# Patient Record
Sex: FEMALE | Race: WHITE | NOT HISPANIC OR LATINO | Employment: UNEMPLOYED | URBAN - METROPOLITAN AREA
[De-identification: names, ages, dates, MRNs, and addresses within clinical notes are randomized per-mention and may not be internally consistent; named-entity substitution may affect disease eponyms.]

---

## 2023-09-03 PROBLEM — I21.09 ACUTE MYOCARDIAL INFARCTION OF ANTERIOR WALL (MULTI): Status: ACTIVE | Noted: 2023-09-03

## 2023-09-03 PROBLEM — E78.00 ELEVATED LDL CHOLESTEROL LEVEL: Status: ACTIVE | Noted: 2021-02-18

## 2023-09-03 PROBLEM — M25.50 POLYARTHRALGIA: Status: ACTIVE | Noted: 2020-10-02

## 2023-09-03 PROBLEM — M81.0 AGE-RELATED OSTEOPOROSIS WITHOUT CURRENT PATHOLOGICAL FRACTURE: Status: ACTIVE | Noted: 2023-09-03

## 2023-09-03 PROBLEM — E78.2 MIXED HYPERLIPIDEMIA: Status: ACTIVE | Noted: 2023-09-03

## 2023-09-03 PROBLEM — I25.10 ASHD (ARTERIOSCLEROTIC HEART DISEASE): Status: ACTIVE | Noted: 2023-09-03

## 2023-09-03 PROBLEM — E55.9 VITAMIN D DEFICIENCY: Status: ACTIVE | Noted: 2020-02-17

## 2023-09-03 PROBLEM — I50.22 CHRONIC SYSTOLIC HEART FAILURE (MULTI): Status: ACTIVE | Noted: 2023-09-03

## 2023-09-03 PROBLEM — M06.9 RHEUMATOID ARTHRITIS (MULTI): Status: ACTIVE | Noted: 2023-09-03

## 2023-09-03 PROBLEM — R70.0 ELEVATED SED RATE: Status: ACTIVE | Noted: 2020-10-06

## 2023-09-03 PROBLEM — R89.9 ABNORMAL LABORATORY TEST: Status: ACTIVE | Noted: 2020-10-06

## 2023-09-03 PROBLEM — E53.8 COBALAMIN DEFICIENCY: Status: ACTIVE | Noted: 2020-02-17

## 2023-09-03 PROBLEM — M18.11 DEGENERATIVE ARTHRITIS OF THUMB, RIGHT: Status: ACTIVE | Noted: 2023-09-03

## 2023-09-03 PROBLEM — E66.9 OBESITY: Status: ACTIVE | Noted: 2023-09-03

## 2023-09-03 RX ORDER — HYDROXYCHLOROQUINE SULFATE 200 MG/1
200 TABLET, FILM COATED ORAL 2 TIMES DAILY
COMMUNITY
Start: 2023-08-18 | End: 2023-11-16

## 2023-09-03 RX ORDER — CLONAZEPAM 0.5 MG/1
0.5 TABLET ORAL 2 TIMES DAILY PRN
COMMUNITY
End: 2023-12-18 | Stop reason: WASHOUT

## 2023-09-03 RX ORDER — ASPIRIN 81 MG/1
81 TABLET ORAL DAILY
COMMUNITY
End: 2023-12-18 | Stop reason: WASHOUT

## 2023-09-03 RX ORDER — CLOPIDOGREL BISULFATE 75 MG/1
75 TABLET ORAL DAILY
COMMUNITY
Start: 2022-07-21

## 2023-09-03 RX ORDER — LOSARTAN POTASSIUM 25 MG/1
25 TABLET ORAL DAILY
COMMUNITY
End: 2023-12-18 | Stop reason: DRUGHIGH

## 2023-09-03 RX ORDER — ROSUVASTATIN CALCIUM 10 MG/1
1 TABLET, COATED ORAL DAILY
COMMUNITY

## 2023-09-03 RX ORDER — PRASUGREL 10 MG/1
10 TABLET, FILM COATED ORAL 2 TIMES DAILY
COMMUNITY
End: 2023-12-18 | Stop reason: WASHOUT

## 2023-09-03 RX ORDER — ETANERCEPT 50 MG/ML
50 SOLUTION SUBCUTANEOUS
COMMUNITY
End: 2023-12-18 | Stop reason: WASHOUT

## 2023-09-03 RX ORDER — LEFLUNOMIDE 10 MG/1
10 TABLET ORAL DAILY
COMMUNITY
End: 2023-12-18 | Stop reason: WASHOUT

## 2023-09-03 RX ORDER — CARVEDILOL 3.12 MG/1
3.12 TABLET ORAL 2 TIMES DAILY
COMMUNITY
End: 2023-12-18 | Stop reason: WASHOUT

## 2023-09-03 RX ORDER — NITROGLYCERIN 0.4 MG/1
0.4 TABLET SUBLINGUAL EVERY 5 MIN PRN
COMMUNITY

## 2023-09-03 RX ORDER — CHOLECALCIFEROL (VITAMIN D3) 50 MCG
2000 TABLET ORAL DAILY
COMMUNITY

## 2023-10-18 ENCOUNTER — TELEPHONE (OUTPATIENT)
Dept: CARDIOLOGY | Facility: CLINIC | Age: 68
End: 2023-10-18

## 2023-12-12 ENCOUNTER — TELEPHONE (OUTPATIENT)
Dept: CARDIOLOGY | Facility: CLINIC | Age: 68
End: 2023-12-12

## 2023-12-15 ENCOUNTER — LAB (OUTPATIENT)
Dept: LAB | Facility: LAB | Age: 68
End: 2023-12-15
Payer: MEDICARE

## 2023-12-15 ENCOUNTER — HOSPITAL ENCOUNTER (OUTPATIENT)
Dept: CARDIOLOGY | Facility: HOSPITAL | Age: 68
Discharge: HOME | End: 2023-12-15
Payer: MEDICARE

## 2023-12-15 DIAGNOSIS — E78.2 MIXED HYPERLIPIDEMIA: Primary | ICD-10-CM

## 2023-12-15 DIAGNOSIS — I50.22 CHRONIC SYSTOLIC (CONGESTIVE) HEART FAILURE (MULTI): ICD-10-CM

## 2023-12-15 DIAGNOSIS — I50.9 HEART FAILURE, UNSPECIFIED (MULTI): ICD-10-CM

## 2023-12-15 LAB
AORTIC VALVE PEAK VELOCITY: 1.54
AV PEAK GRADIENT: 9.5
AVA (PEAK VEL): 2.17
CHOLEST SERPL-MCNC: 137 MG/DL (ref 133–200)
CHOLEST/HDLC SERPL: 2.6 {RATIO}
EJECTION FRACTION APICAL 4 CHAMBER: 40.9
EJECTION FRACTION: 39
GLOBAL LONGITUDINAL STRAIN: -10
HDLC SERPL-MCNC: 52 MG/DL
LDLC SERPL CALC-MCNC: 69 MG/DL (ref 65–130)
LEFT ATRIUM VOLUME AREA LENGTH INDEX BSA: 29.3
LEFT VENTRICLE INTERNAL DIMENSION DIASTOLE: 5.27 (ref 3.5–6)
LEFT VENTRICULAR OUTFLOW TRACT DIAMETER: 2.1
MITRAL VALVE E/A RATIO: 0.98
MITRAL VALVE E/E' RATIO: 13.18
RIGHT VENTRICLE FREE WALL PEAK S': 10.7
TRICUSPID ANNULAR PLANE SYSTOLIC EXCURSION: 2.4
TRIGL SERPL-MCNC: 82 MG/DL (ref 40–150)

## 2023-12-15 PROCEDURE — 80061 LIPID PANEL: CPT

## 2023-12-15 PROCEDURE — 36415 COLL VENOUS BLD VENIPUNCTURE: CPT

## 2023-12-15 PROCEDURE — 2500000004 HC RX 250 GENERAL PHARMACY W/ HCPCS (ALT 636 FOR OP/ED): Performed by: INTERNAL MEDICINE

## 2023-12-15 PROCEDURE — 93306 TTE W/DOPPLER COMPLETE: CPT

## 2023-12-15 PROCEDURE — 93306 TTE W/DOPPLER COMPLETE: CPT | Performed by: INTERNAL MEDICINE

## 2023-12-15 RX ADMIN — PERFLUTREN 2 ML OF DILUTION: 6.52 INJECTION, SUSPENSION INTRAVENOUS at 11:00

## 2023-12-15 NOTE — PROGRESS NOTES
"Annual-menopause  Subjective   Francine Amaya is a 68 y.o. female who is here for a menopausal gyn  exam.  denies vag bleed or d/c; denies pelvic pain, pressure, or persistent bloating.         Concerns=feels like getting uti/left sample.  PMHx: BMI 36.       Rheumatoid arthritis ? rheum specialists disagree on dx.       ASHD s/p AWMI [facility] [facility] 7/31/2021. LVEF 35-39% (echo 8/21).       Nuclear stress test: Negative ischemia, anterior infarct, EF 45% (7/22).  Surg Hx: kidney stones 19982        rt knee replacement        bunion surgery both feet        eyelid lift        c-sections 1984.1988  Last pap  11/19/2021-neg.hpv-neg.   Mamm 12/18/23 completed/report pending.   DEXA 06/2023 spine 0.2/hip-0.9   Periods : Menopausal;early 50's;never hrt.   Menarche 13.   Sexual activity currently sexually active; dryness/discomfort/infreq.   Total pregnancies  2.  C section(s)  2.   Review of Systems   Constitutional:  Negative for activity change, fatigue and unexpected weight change.   Respiratory:  Negative for chest tightness and shortness of breath.    Cardiovascular:  Negative for chest pain and leg swelling.   Gastrointestinal:  Negative for abdominal distention and abdominal pain.   Genitourinary:  Positive for difficulty urinating, dysuria and frequency. Negative for genital sores, pelvic pain, vaginal bleeding, vaginal discharge and vaginal pain.   Musculoskeletal:  Negative for gait problem and joint swelling.   Skin:  Negative for color change and rash.   Neurological:  Negative for dizziness, weakness and headaches.   Hematological:  Negative for adenopathy.   Objective  Visit Vitals  /62   Ht 1.651 m (5' 5\")   Wt 99.3 kg (219 lb)   BMI 36.44 kg/m²   OB Status Postmenopausal   Smoking Status Never   BSA 2.13 m²       General:   Alert and oriented, in no acute distress   Neck: Supple. No visible thyromegaly.    Breast/Axilla: Normal to palpation bilaterally without masses, skin changes, or nipple discharge.  "   Abdomen: Soft, non-tender, without masses or organomegaly   Vulva: Normal architecture without erythema, masses, or lesions.    Vagina: mucosa without lesions, masses; pos for  atrophy. No abnormal vaginal discharge.    Cervix: Normal without masses, lesions, or signs of cervicitis.    Uterus:  Grossly Normal mobile, non-enlarged uterus ; exam limited by bmi   Adnexa: No palpable  masses or tenderness; exam limited by bmi   Pelvic Floor No POP noted. No high tone pelvic floor    Psych  Rectal Normal affect. Normal mood.        Assessment/Plan   Encounter Diagnoses   Name Primary?    Female climacteric state; grossly normal breast exam; grossly normal GYN exam but limited by BMI.     Dysuria; in office urinalysis positive for nitrites; will send for culture     Acute cystitis without hematuria; prescription for Macrobid sent to local pharmacy Yes    Postmenopausal atrophic vaginitis; treatments for GSM reviewed    Alexsandra Moscoso MD

## 2023-12-18 ENCOUNTER — LAB (OUTPATIENT)
Dept: LAB | Facility: LAB | Age: 68
End: 2023-12-18
Payer: MEDICARE

## 2023-12-18 ENCOUNTER — APPOINTMENT (OUTPATIENT)
Dept: OBSTETRICS AND GYNECOLOGY | Facility: CLINIC | Age: 68
End: 2023-12-18

## 2023-12-18 ENCOUNTER — APPOINTMENT (OUTPATIENT)
Dept: RADIOLOGY | Facility: CLINIC | Age: 68
End: 2023-12-18
Payer: MEDICARE

## 2023-12-18 ENCOUNTER — ANCILLARY PROCEDURE (OUTPATIENT)
Dept: RADIOLOGY | Facility: CLINIC | Age: 68
End: 2023-12-18
Payer: MEDICARE

## 2023-12-18 ENCOUNTER — OFFICE VISIT (OUTPATIENT)
Dept: CARDIOLOGY | Facility: CLINIC | Age: 68
End: 2023-12-18
Payer: MEDICARE

## 2023-12-18 ENCOUNTER — OFFICE VISIT (OUTPATIENT)
Dept: OBSTETRICS AND GYNECOLOGY | Facility: CLINIC | Age: 68
End: 2023-12-18
Payer: MEDICARE

## 2023-12-18 VITALS
BODY MASS INDEX: 36.49 KG/M2 | SYSTOLIC BLOOD PRESSURE: 122 MMHG | WEIGHT: 219 LBS | HEIGHT: 65 IN | DIASTOLIC BLOOD PRESSURE: 62 MMHG

## 2023-12-18 VITALS
SYSTOLIC BLOOD PRESSURE: 134 MMHG | BODY MASS INDEX: 36.61 KG/M2 | DIASTOLIC BLOOD PRESSURE: 84 MMHG | HEART RATE: 77 BPM | WEIGHT: 220 LBS | OXYGEN SATURATION: 98 %

## 2023-12-18 VITALS — BODY MASS INDEX: 35.82 KG/M2 | WEIGHT: 215 LBS | HEIGHT: 65 IN

## 2023-12-18 DIAGNOSIS — I25.10 ASHD (ARTERIOSCLEROTIC HEART DISEASE): Primary | ICD-10-CM

## 2023-12-18 DIAGNOSIS — Z12.31 ENCOUNTER FOR SCREENING MAMMOGRAM FOR MALIGNANT NEOPLASM OF BREAST: ICD-10-CM

## 2023-12-18 DIAGNOSIS — E78.2 MIXED HYPERLIPIDEMIA: ICD-10-CM

## 2023-12-18 DIAGNOSIS — N95.1 FEMALE CLIMACTERIC STATE: ICD-10-CM

## 2023-12-18 DIAGNOSIS — R30.0 DYSURIA: ICD-10-CM

## 2023-12-18 DIAGNOSIS — I25.10 ASHD (ARTERIOSCLEROTIC HEART DISEASE): ICD-10-CM

## 2023-12-18 DIAGNOSIS — I50.22 CHRONIC SYSTOLIC HEART FAILURE (MULTI): ICD-10-CM

## 2023-12-18 DIAGNOSIS — N30.00 ACUTE CYSTITIS WITHOUT HEMATURIA: Primary | ICD-10-CM

## 2023-12-18 DIAGNOSIS — I51.3 APICAL MURAL THROMBUS: ICD-10-CM

## 2023-12-18 DIAGNOSIS — N95.2 POSTMENOPAUSAL ATROPHIC VAGINITIS: ICD-10-CM

## 2023-12-18 LAB
ERYTHROCYTE [DISTWIDTH] IN BLOOD BY AUTOMATED COUNT: 13.6 % (ref 11.5–14.5)
HCT VFR BLD AUTO: 37.6 % (ref 36–46)
HGB BLD-MCNC: 11.7 G/DL (ref 12–16)
MCH RBC QN AUTO: 31.4 PG (ref 26–34)
MCHC RBC AUTO-ENTMCNC: 31.1 G/DL (ref 32–36)
MCV RBC AUTO: 101 FL (ref 80–100)
NRBC BLD-RTO: 0 /100 WBCS (ref 0–0)
PLATELET # BLD AUTO: 194 X10*3/UL (ref 150–450)
POC APPEARANCE, URINE: ABNORMAL
POC BILIRUBIN, URINE: NEGATIVE
POC BLOOD, URINE: ABNORMAL
POC COLOR, URINE: YELLOW
POC GLUCOSE, URINE: NEGATIVE MG/DL
POC KETONES, URINE: NEGATIVE MG/DL
POC LEUKOCYTES, URINE: ABNORMAL
POC NITRITE,URINE: POSITIVE
POC PH, URINE: 5.5 PH
POC PROTEIN, URINE: NEGATIVE MG/DL
POC SPECIFIC GRAVITY, URINE: >=1.03
POC UROBILINOGEN, URINE: 0.2 EU/DL
RBC # BLD AUTO: 3.73 X10*6/UL (ref 4–5.2)
WBC # BLD AUTO: 6.1 X10*3/UL (ref 4.4–11.3)

## 2023-12-18 PROCEDURE — 87086 URINE CULTURE/COLONY COUNT: CPT | Performed by: OBSTETRICS & GYNECOLOGY

## 2023-12-18 PROCEDURE — 1036F TOBACCO NON-USER: CPT | Performed by: OBSTETRICS & GYNECOLOGY

## 2023-12-18 PROCEDURE — 1036F TOBACCO NON-USER: CPT | Performed by: INTERNAL MEDICINE

## 2023-12-18 PROCEDURE — 81003 URINALYSIS AUTO W/O SCOPE: CPT | Performed by: OBSTETRICS & GYNECOLOGY

## 2023-12-18 PROCEDURE — 85027 COMPLETE CBC AUTOMATED: CPT

## 2023-12-18 PROCEDURE — 36415 COLL VENOUS BLD VENIPUNCTURE: CPT

## 2023-12-18 PROCEDURE — 99214 OFFICE O/P EST MOD 30 MIN: CPT | Performed by: OBSTETRICS & GYNECOLOGY

## 2023-12-18 PROCEDURE — 77067 SCR MAMMO BI INCL CAD: CPT

## 2023-12-18 PROCEDURE — 99214 OFFICE O/P EST MOD 30 MIN: CPT | Performed by: INTERNAL MEDICINE

## 2023-12-18 PROCEDURE — 1126F AMNT PAIN NOTED NONE PRSNT: CPT | Performed by: OBSTETRICS & GYNECOLOGY

## 2023-12-18 PROCEDURE — 1159F MED LIST DOCD IN RCRD: CPT | Performed by: INTERNAL MEDICINE

## 2023-12-18 PROCEDURE — 1126F AMNT PAIN NOTED NONE PRSNT: CPT | Performed by: INTERNAL MEDICINE

## 2023-12-18 PROCEDURE — 1159F MED LIST DOCD IN RCRD: CPT | Performed by: OBSTETRICS & GYNECOLOGY

## 2023-12-18 RX ORDER — NITROFURANTOIN (MACROCRYSTALS) 100 MG/1
100 CAPSULE ORAL 2 TIMES DAILY
Qty: 10 CAPSULE | Refills: 0 | Status: SHIPPED | OUTPATIENT
Start: 2023-12-18 | End: 2023-12-23

## 2023-12-18 RX ORDER — LOSARTAN POTASSIUM 50 MG/1
50 TABLET ORAL DAILY
Qty: 90 TABLET | Refills: 3 | Status: SHIPPED | OUTPATIENT
Start: 2023-12-18 | End: 2024-12-17

## 2023-12-18 ASSESSMENT — ENCOUNTER SYMPTOMS
FATIGUE: 0
ABDOMINAL PAIN: 0
NUMBNESS: 0
DYSURIA: 1
ACTIVITY CHANGE: 0
DYSURIA: 0
HEADACHES: 0
FREQUENCY: 1
ADENOPATHY: 0
JOINT SWELLING: 0
SHORTNESS OF BREATH: 0
COUGH: 0
ABDOMINAL DISTENTION: 0
DIZZINESS: 0
COLOR CHANGE: 0
UNEXPECTED WEIGHT CHANGE: 0
LOSS OF SENSATION IN FEET: 0
DIFFICULTY URINATING: 1
DYSPNEA ON EXERTION: 0
BLURRED VISION: 0
ABDOMINAL PAIN: 0
PARESTHESIAS: 0
PALPITATIONS: 0
DEPRESSION: 0
SHORTNESS OF BREATH: 0
HEMATURIA: 0
WEAKNESS: 0
OCCASIONAL FEELINGS OF UNSTEADINESS: 0
CHEST TIGHTNESS: 0

## 2023-12-18 ASSESSMENT — LIFESTYLE VARIABLES
HOW OFTEN DO YOU HAVE A DRINK CONTAINING ALCOHOL: MONTHLY OR LESS
SKIP TO QUESTIONS 9-10: 1
HOW OFTEN DO YOU HAVE SIX OR MORE DRINKS ON ONE OCCASION: NEVER
HOW MANY STANDARD DRINKS CONTAINING ALCOHOL DO YOU HAVE ON A TYPICAL DAY: 1 OR 2
AUDIT-C TOTAL SCORE: 1

## 2023-12-18 ASSESSMENT — PATIENT HEALTH QUESTIONNAIRE - PHQ9
2. FEELING DOWN, DEPRESSED OR HOPELESS: NOT AT ALL
SUM OF ALL RESPONSES TO PHQ9 QUESTIONS 1 AND 2: 0
1. LITTLE INTEREST OR PLEASURE IN DOING THINGS: NOT AT ALL

## 2023-12-18 ASSESSMENT — PAIN SCALES - GENERAL: PAINLEVEL: 0-NO PAIN

## 2023-12-18 NOTE — ASSESSMENT & PLAN NOTE
Cardiac wise patient doing relatively well.  No chest pain or anginal type symptoms.  Overall good physical activity level.

## 2023-12-18 NOTE — ASSESSMENT & PLAN NOTE
I have suggested anticoagulation for 6 months.  Discussed warfarin versus DOAC.  Will prescribe Xarelto 20 mg daily and hopefully will be covered on plan.   Will also check CBC and make sure not anemic.

## 2023-12-18 NOTE — PROGRESS NOTES
Subjective   Francine Amaya is a 68 y.o. female.    Chief Complaint:  No chief complaint on file.    HPI  Some fatigue last 6 months or so.  No anginal type chest pains.  Told she has seronegative rheumatoid arthritis.    Review of Systems   Constitutional: Positive for malaise/fatigue.   HENT:  Negative for congestion.    Eyes:  Negative for blurred vision.   Cardiovascular:  Negative for chest pain, dyspnea on exertion and palpitations.   Respiratory:  Negative for cough and shortness of breath.    Musculoskeletal:  Positive for joint pain.   Gastrointestinal:  Negative for abdominal pain.   Genitourinary:  Negative for dysuria and hematuria.   Neurological:  Negative for numbness and paresthesias.       Objective   Constitutional:       Appearance: Not in distress.   Eyes:      Conjunctiva/sclera: Conjunctivae normal.   Neck:      Vascular: JVD normal.   Pulmonary:      Breath sounds: Normal breath sounds. No wheezing. No rhonchi. No rales.   Cardiovascular:      Normal rate. Regular rhythm.      Murmurs: There is no murmur.      No gallop.  No click. No rub.   Abdominal:      Palpations: Abdomen is soft.   Neurological:      General: No focal deficit present.      Mental Status: Alert.         Lab Review:   Office Visit on 12/18/2023   Component Date Value    POC Color, Urine 12/18/2023 Yellow     POC Appearance, Urine 12/18/2023 Cloudy (A)     POC Glucose, Urine 12/18/2023 NEGATIVE     POC Bilirubin, Urine 12/18/2023 NEGATIVE     POC Ketones, Urine 12/18/2023 NEGATIVE     POC Specific Gravity, Ur* 12/18/2023 >=1.030     POC Blood, Urine 12/18/2023 SMALL (1+) (A)     POC PH, Urine 12/18/2023 5.5     POC Protein, Urine 12/18/2023 NEGATIVE     POC Urobilinogen, Urine 12/18/2023 0.2     Poc Nitrite, Urine 12/18/2023 POSITIVE (A)     POC Leukocytes, Urine 12/18/2023 SMALL (1+) (A)    Hospital Outpatient Visit on 12/15/2023   Component Date Value    AV pk sue 12/15/2023 1.54     LVOT diam 12/15/2023 2.10     MV E/A  ratio 12/15/2023 0.98     Tricuspid annular plane * 12/15/2023 2.4     LV biplane EF 12/15/2023 39     LA vol index A/L 12/15/2023 29.3     MV avg E/e' ratio 12/15/2023 13.18     RV free wall pk S' 12/15/2023 10.70     LV GLS 12/15/2023 -10.0     LVIDd 12/15/2023 5.27     AV pk grad 12/15/2023 9.5     Aortic Valve Area by Con* 12/15/2023 2.17     LV A4C EF 12/15/2023 40.9    Lab on 12/15/2023   Component Date Value    Cholesterol 12/15/2023 137     HDL-Cholesterol 12/15/2023 52.0     Cholesterol/HDL Ratio 12/15/2023 2.6     LDL Calculated 12/15/2023 69     Triglycerides 12/15/2023 82        Assessment/Plan   The primary encounter diagnosis was ASHD (arteriosclerotic heart disease). Diagnoses of Chronic systolic heart failure (CMS/HCC) and Mixed hyperlipidemia were also pertinent to this visit.    Chronic systolic heart failure (CMS/HCC)  She has not tolerated beta blockers, carvedilol, and feels better off.      Mixed hyperlipidemia  Reviewed lipid panel:  Tchol 137 TG 82 HdL 52 LDL 61  Will recheck fall 2024    ASHD (arteriosclerotic heart disease)  Cardiac wise patient doing relatively well.  No chest pain or anginal type symptoms.  Overall good physical activity level.    Apical mural thrombus  I have suggested anticoagulation for 6 months.  Discussed warfarin versus DOAC.  Will prescribe Xarelto 20 mg daily and hopefully will be covered on plan.   Will also check CBC and make sure not anemic.

## 2023-12-20 ENCOUNTER — APPOINTMENT (OUTPATIENT)
Dept: OBSTETRICS AND GYNECOLOGY | Facility: CLINIC | Age: 68
End: 2023-12-20

## 2023-12-21 LAB — BACTERIA UR CULT: ABNORMAL

## 2024-01-10 ENCOUNTER — TELEPHONE (OUTPATIENT)
Dept: CARDIOLOGY | Facility: CLINIC | Age: 69
End: 2024-01-10
Payer: MEDICARE

## 2024-01-10 NOTE — TELEPHONE ENCOUNTER
Had long discussion with patient on anticoagulation therapy for her LV thrombus.  She is very angry about the cost of the Xarelto but will pay it since she will likely be on it for only 6 months.

## 2024-01-10 NOTE — TELEPHONE ENCOUNTER
Patient called the office today stating that her medicare won't allow her to use a copay card for xaralto and it will be $545.00 per month.  She is asking if there is something else she can take?  Please advise, thanks  Call back [phone]

## 2024-05-06 ENCOUNTER — OFFICE VISIT (OUTPATIENT)
Dept: OTOLARYNGOLOGY | Facility: CLINIC | Age: 69
End: 2024-05-06
Payer: MEDICARE

## 2024-05-06 VITALS — TEMPERATURE: 97.9 F | HEIGHT: 65 IN | WEIGHT: 210 LBS | BODY MASS INDEX: 34.99 KG/M2

## 2024-05-06 DIAGNOSIS — H61.21 IMPACTED CERUMEN OF RIGHT EAR: Primary | ICD-10-CM

## 2024-05-06 DIAGNOSIS — H90.3 SENSORINEURAL HEARING LOSS (SNHL) OF BOTH EARS: ICD-10-CM

## 2024-05-06 DIAGNOSIS — R42 DIZZINESS AND GIDDINESS: ICD-10-CM

## 2024-05-06 PROCEDURE — 99204 OFFICE O/P NEW MOD 45 MIN: CPT | Performed by: OTOLARYNGOLOGY

## 2024-05-06 PROCEDURE — 1159F MED LIST DOCD IN RCRD: CPT | Performed by: OTOLARYNGOLOGY

## 2024-05-06 PROCEDURE — 1036F TOBACCO NON-USER: CPT | Performed by: OTOLARYNGOLOGY

## 2024-05-06 NOTE — PROGRESS NOTES
"Chief Complaint   Patient presents with    New Patient Visit     NP HAD TM PERF IN DEC, RT EAR PAIN IN MARCH IN [place], HAD AUDIO IN  SYSTEM IN DEC, OFF BALANCE      Date of Evaluation: 2024   HPI  Francine Amaya is a 69 y.o. female here for ear complaints.  In December she had otalgia.  She was diagnosed with an ear infection and treated with doxycycline and felt better.  In March she developed an upper respiratory tract infection and the right ear has started to feel plugged with popping and intermittent pain.  She has been having some unsteadiness of her gait and some dizziness.  She feels as though she is not hearing well.  She has been treated with nasal sprays and antibiotics without benefit.  She has been flying back and forth from [place] and has not noticed any increased pain on the airplane.       Past Medical History:   Diagnosis Date    Ear pain, referred, right     Heart attack (Multi)     Hypertension       Past Surgical History:   Procedure Laterality Date    BUNIONECTOMY Bilateral     CAROTID STENT       SECTION, CLASSIC      IR INJECTION NERVE BLOCK      KIDNEY STONE SURGERY      KNEE SURGERY Right           Medications:   Current Outpatient Medications   Medication Instructions    cholecalciferol (VITAMIN D-3) 2,000 Units, oral, Daily    clopidogrel (PLAVIX) 75 mg, oral, Daily    losartan (COZAAR) 50 mg, oral, Daily    nitroglycerin (NITROSTAT) 0.4 mg, sublingual, Every 5 min PRN, may repeat up to 3 times before seeking medical attention<BR>    rivaroxaban (XARELTO) 20 mg, oral, Daily with evening meal, Take with food.    rosuvastatin (Crestor) 10 mg tablet 1 tablet, oral, Daily        Allergies:  Allergies   Allergen Reactions    Amoxicillin Unknown    Ciprofloxacin Unknown    Meperidine Unknown    Sulfa (Sulfonamide Antibiotics) Unknown    Sulfamethoxazole-Trimethoprim Hives        Physical Exam:  Last Recorded Vitals  Temperature 36.6 °C (97.9 °F), height 1.651 m (5' 5\"), weight 95.3 " kg (210 lb).  []General appearance: Well-developed, well-nourished in no acute distress, conversant with normal voice quality    Head/face: No erythema or edema or facial tenderness, and normal facial nerve function bilaterally    External ear: Clear external auditory canals with normal pinnae cerumen in the ear canal the drapes down across the tympanic membrane.  This was removed under the microscope  Tube status: N/A  Middle ear: Tympanic membranes intact and mobile, middle ears normal.  Right tympanosclerosis  Tympanic membrane perforation: N/A  Mastoid bowl: N/A  Hearing: Normal conversational awareness at normal speech thresholds    Nose visualized using: Anterior rhinoscopy  Nasal dorsum: Nontraumatic midline appearance  Septum: Midline, nonobstructing  Inferior turbinates: Normal, pink  Secretions: Dry    Oral cavity and oropharynx: Normal  Teeth: Good condition  Floor of mouth: without lesions  Palate: Normal hard palate, soft palate and uvula  Oropharynx: Clear, no lesions present  Buccal mucosa: Normal without masses or lesions  Lips: Normal    Nasopharynx: Inadequate mirror exam secondary to gag/anatomy    Neck:  Salivary glands: Normal bilateral parotid and submandibular glands by inspection and palpation.  Non-thyroid masses: No palpable masses or significant lymphadenopathy  Trachea: Midline  Thyroid: No thyromegaly or palpable nodules  Temporomandibular joint: Nontender  Cervical range of motion: Normal    Neurologic exam: Alert and oriented x3, appropriate affect.  Cranial nerves II-XII normal bilaterally  Extraocular movement: Extraocular movement intact, normal gaze alignment        Francine was seen today for new patient visit.  Diagnoses and all orders for this visit:  Impacted cerumen of right ear (Primary)  Sensorineural hearing loss (SNHL) of both ears  -     Comprehensive hearing test; Future  Dizziness and giddiness  -     V-Hit; Future       PLAN  I believe her current ear plugging and  decreased hearing symptoms are related to cerumen that I have resolved with removal of cerumen.  She is having some more chronic dizziness.  I have recommended a VNG and audiogram.  Follow-up afterwards    Gerber Amos MD

## 2024-05-19 DIAGNOSIS — I50.22 CHRONIC SYSTOLIC HEART FAILURE (MULTI): Primary | ICD-10-CM

## 2024-05-20 RX ORDER — LOSARTAN POTASSIUM 25 MG/1
25 TABLET ORAL DAILY
Qty: 90 TABLET | Refills: 3 | Status: SHIPPED | OUTPATIENT
Start: 2024-05-20

## 2024-06-04 DIAGNOSIS — I25.10 ASHD (ARTERIOSCLEROTIC HEART DISEASE): Primary | ICD-10-CM

## 2024-06-04 RX ORDER — CARVEDILOL 3.12 MG/1
3.12 TABLET ORAL
Qty: 180 TABLET | Refills: 3 | Status: SHIPPED | OUTPATIENT
Start: 2024-06-04

## 2024-06-04 RX ORDER — ASPIRIN 81 MG/1
81 TABLET ORAL DAILY
Qty: 90 TABLET | Refills: 3 | Status: SHIPPED | OUTPATIENT
Start: 2024-06-04

## 2024-06-06 DIAGNOSIS — I51.3 APICAL MURAL THROMBUS: ICD-10-CM

## 2024-06-06 DIAGNOSIS — I25.10 ASHD (ARTERIOSCLEROTIC HEART DISEASE): ICD-10-CM

## 2024-06-06 DIAGNOSIS — I50.22 CHRONIC SYSTOLIC HEART FAILURE (MULTI): ICD-10-CM

## 2024-06-06 DIAGNOSIS — E78.2 MIXED HYPERLIPIDEMIA: ICD-10-CM

## 2024-06-11 ENCOUNTER — APPOINTMENT (OUTPATIENT)
Dept: AUDIOLOGY | Facility: CLINIC | Age: 69
End: 2024-06-11
Payer: MEDICARE

## 2024-06-19 ENCOUNTER — APPOINTMENT (OUTPATIENT)
Dept: OTOLARYNGOLOGY | Facility: CLINIC | Age: 69
End: 2024-06-19
Payer: MEDICARE

## 2024-07-01 NOTE — ASSESSMENT & PLAN NOTE
Lipids done in December: Tchol 137 TG 82 HDL 52 LDL 69.  Reasonable numbers no change will be made at this time.

## 2024-07-03 ENCOUNTER — OFFICE VISIT (OUTPATIENT)
Dept: CARDIOLOGY | Facility: CLINIC | Age: 69
End: 2024-07-03
Payer: MEDICARE

## 2024-07-03 VITALS
DIASTOLIC BLOOD PRESSURE: 64 MMHG | BODY MASS INDEX: 32.45 KG/M2 | SYSTOLIC BLOOD PRESSURE: 114 MMHG | HEART RATE: 72 BPM | WEIGHT: 195 LBS

## 2024-07-03 DIAGNOSIS — I25.10 ASHD (ARTERIOSCLEROTIC HEART DISEASE): Primary | ICD-10-CM

## 2024-07-03 DIAGNOSIS — E78.2 MIXED HYPERLIPIDEMIA: ICD-10-CM

## 2024-07-03 DIAGNOSIS — I50.22 CHRONIC SYSTOLIC HEART FAILURE (MULTI): ICD-10-CM

## 2024-07-03 PROCEDURE — 99214 OFFICE O/P EST MOD 30 MIN: CPT | Performed by: INTERNAL MEDICINE

## 2024-07-03 PROCEDURE — 1036F TOBACCO NON-USER: CPT | Performed by: INTERNAL MEDICINE

## 2024-07-03 PROCEDURE — 1126F AMNT PAIN NOTED NONE PRSNT: CPT | Performed by: INTERNAL MEDICINE

## 2024-07-03 PROCEDURE — 1159F MED LIST DOCD IN RCRD: CPT | Performed by: INTERNAL MEDICINE

## 2024-07-03 RX ORDER — CELECOXIB 200 MG/1
1 CAPSULE ORAL DAILY PRN
COMMUNITY
Start: 2024-06-03

## 2024-07-03 ASSESSMENT — ENCOUNTER SYMPTOMS
PARESTHESIAS: 0
PALPITATIONS: 0
HEMATURIA: 0
DYSPNEA ON EXERTION: 0
SHORTNESS OF BREATH: 0
NUMBNESS: 0
BLURRED VISION: 0
ABDOMINAL PAIN: 0
DYSURIA: 0
COUGH: 0

## 2024-07-03 ASSESSMENT — PAIN SCALES - GENERAL: PAINLEVEL: 0-NO PAIN

## 2024-07-03 NOTE — ASSESSMENT & PLAN NOTE
Stable with no anginal type symptoms.  Her last echocardiogram did reveal apical wall akinesia with a probable apical thrombus and she was placed on Xarelto.  Will repeat echocardiogram and if no significant thrombus burden will eliminate Xarelto and continue antiplatelet therapy with clopidogrel 75 mg once daily.  She will call me the day after her echocardiogram to review the results over the phone.

## 2024-07-03 NOTE — PROGRESS NOTES
Subjective   Francine Amaya is a 69 y.o. female.    Chief Complaint:  ASHD (6 MONTH F/U)    HPI  Overall patient feels well other than arthritic joint pains.  No chest pain or anginal type symptoms.  No palpitations.    Review of Systems   Constitutional: Negative for malaise/fatigue.   HENT:  Negative for congestion.    Eyes:  Negative for blurred vision.   Cardiovascular:  Negative for chest pain, dyspnea on exertion and palpitations.   Respiratory:  Negative for cough and shortness of breath.    Musculoskeletal:  Positive for arthritis and joint pain.   Gastrointestinal:  Negative for abdominal pain.   Genitourinary:  Negative for dysuria and hematuria.   Neurological:  Negative for numbness and paresthesias.       Objective   Constitutional:       Appearance: Not in distress.   Eyes:      Conjunctiva/sclera: Conjunctivae normal.   Neck:      Vascular: JVD normal.   Pulmonary:      Breath sounds: Normal breath sounds. No wheezing. No rhonchi. No rales.   Cardiovascular:      Normal rate. Regular rhythm.      Murmurs: There is no murmur.      No gallop.  No click. No rub.   Abdominal:      Palpations: Abdomen is soft.   Neurological:      General: No focal deficit present.      Mental Status: Alert.         Lab Review:   No visits with results within 2 Month(s) from this visit.   Latest known visit with results is:   Lab on 12/18/2023   Component Date Value    WBC 12/18/2023 6.1     nRBC 12/18/2023 0.0     RBC 12/18/2023 3.73 (L)     Hemoglobin 12/18/2023 11.7 (L)     Hematocrit 12/18/2023 37.6     MCV 12/18/2023 101 (H)     MCH 12/18/2023 31.4     MCHC 12/18/2023 31.1 (L)     RDW 12/18/2023 13.6     Platelets 12/18/2023 194        Assessment/Plan   The primary encounter diagnosis was ASHD (arteriosclerotic heart disease). Diagnoses of Chronic systolic heart failure (Multi) and Mixed hyperlipidemia were also pertinent to this visit.    Mixed hyperlipidemia  Lipids done in December: Tchol 137 TG 82 HDL 52 LDL 69.   Reasonable numbers no change will be made at this time.    ASHD (arteriosclerotic heart disease)  Stable with no anginal type symptoms.  Her last echocardiogram did reveal apical wall akinesia with a probable apical thrombus and she was placed on Xarelto.  Will repeat echocardiogram and if no significant thrombus burden will eliminate Xarelto and continue antiplatelet therapy with clopidogrel 75 mg once daily.  She will call me the day after her echocardiogram to review the results over the phone.    Chronic systolic heart failure (Multi)  Clinically very well compensated at this time.  Will recheck echocardiogram primarily to look for evidence of apical thrombus but also to assess left ventricular systolic function as well.  The patient will call me the day after the echocardiogram and we will decide on whether to discontinue the Xarelto therapy.  Continue losartan at 50 mg daily.  Will follow clinically.  Return for office visit prior to her [place] trip in December.

## 2024-07-03 NOTE — ASSESSMENT & PLAN NOTE
Clinically very well compensated at this time.  Will recheck echocardiogram primarily to look for evidence of apical thrombus but also to assess left ventricular systolic function as well.  The patient will call me the day after the echocardiogram and we will decide on whether to discontinue the Xarelto therapy.  Continue losartan at 50 mg daily.  Will follow clinically.  Return for office visit prior to her [place] trip in December.

## 2024-07-10 ENCOUNTER — TELEPHONE (OUTPATIENT)
Dept: CARDIOLOGY | Facility: CLINIC | Age: 69
End: 2024-07-10
Payer: MEDICARE

## 2024-07-10 NOTE — TELEPHONE ENCOUNTER
Called patient and stated okay to place the Xarelto on hold after she finishes up the current prescription.  She will have the echocardiogram on July 25 and will call me the day after and then we will repeat discuss whether continuing anticoagulation will be recommended or not.

## 2024-07-10 NOTE — TELEPHONE ENCOUNTER
Patient called said her Echo is scheduled 7/25, and Dr. Roberts said she may be able to go off of her xarelto pending results. In the meantime she has enough pills for this week only but doesn't want to pay $100 for the next refill if she will be going off of it. Patient wants to know if its safe to stop it until results of echo so she doesn't have to waste money on medication if she doesn't need it.

## 2024-07-25 ENCOUNTER — HOSPITAL ENCOUNTER (OUTPATIENT)
Dept: CARDIOLOGY | Facility: HOSPITAL | Age: 69
Discharge: HOME | End: 2024-07-25
Payer: MEDICARE

## 2024-07-25 DIAGNOSIS — I50.22 CHRONIC SYSTOLIC HEART FAILURE (MULTI): ICD-10-CM

## 2024-07-25 LAB
AORTIC VALVE MEAN GRADIENT: 4.8 MMHG
AORTIC VALVE PEAK VELOCITY: 1.46 M/S
AV PEAK GRADIENT: 8.6 MMHG
AVA (PEAK VEL): 1.81 CM2
AVA (VTI): 1.93 CM2
EJECTION FRACTION APICAL 4 CHAMBER: 35.3
EJECTION FRACTION: 33 %
LEFT ATRIUM VOLUME AREA LENGTH INDEX BSA: 33 ML/M2
LEFT VENTRICLE INTERNAL DIMENSION DIASTOLE: 6.12 CM (ref 3.5–6)
LEFT VENTRICULAR OUTFLOW TRACT DIAMETER: 2.07 CM
LV EJECTION FRACTION BIPLANE: 33 %
MITRAL VALVE E/A RATIO: 0.7
MITRAL VALVE E/E' RATIO: 14.01
RIGHT VENTRICLE FREE WALL PEAK S': 12.15 CM/S
TRICUSPID ANNULAR PLANE SYSTOLIC EXCURSION: 2 CM

## 2024-07-25 PROCEDURE — 93306 TTE W/DOPPLER COMPLETE: CPT | Performed by: INTERNAL MEDICINE

## 2024-07-25 PROCEDURE — 2500000004 HC RX 250 GENERAL PHARMACY W/ HCPCS (ALT 636 FOR OP/ED): Performed by: INTERNAL MEDICINE

## 2024-07-25 PROCEDURE — 93306 TTE W/DOPPLER COMPLETE: CPT

## 2024-07-26 ENCOUNTER — TELEPHONE (OUTPATIENT)
Dept: CARDIOLOGY | Facility: CLINIC | Age: 69
End: 2024-07-26

## 2024-07-26 NOTE — TELEPHONE ENCOUNTER
Pt called the office requesting result for recent Echo result   Please advice   Good call back number  [phone]

## 2024-07-29 NOTE — TELEPHONE ENCOUNTER
Left message on voice mail that echo did not reveal any visible thrombus, thus ok to stop the Xarelto.  With reduced EF, stay on Carvedilol and Losartan, likely repeat echo next year and reassess.

## 2024-08-03 DIAGNOSIS — I25.10 ASHD (ARTERIOSCLEROTIC HEART DISEASE): Primary | ICD-10-CM

## 2024-08-05 RX ORDER — CLOPIDOGREL BISULFATE 75 MG/1
75 TABLET ORAL DAILY
Qty: 90 TABLET | Refills: 3 | Status: SHIPPED | OUTPATIENT
Start: 2024-08-05

## 2024-10-04 ENCOUNTER — TELEPHONE (OUTPATIENT)
Dept: CARDIOLOGY | Facility: CLINIC | Age: 69
End: 2024-10-04

## 2024-10-04 NOTE — TELEPHONE ENCOUNTER
Pt called the office stated she has a upcoming Nerve Block 1 procedure 10/14/2024  With Dr. Munoz   Pt needs to know if she needs to hold any of her blood thinners prior to procedure   Please advice   Good call back number  [phone]

## 2024-10-04 NOTE — TELEPHONE ENCOUNTER
Called patient and agreed to hold Xarelto 48 hours prior to her nerve block and the Plavix 1 week prior to the nerve block.

## 2024-10-29 ENCOUNTER — TELEPHONE (OUTPATIENT)
Dept: OBSTETRICS AND GYNECOLOGY | Facility: CLINIC | Age: 69
End: 2024-10-29
Payer: MEDICARE

## 2024-10-29 DIAGNOSIS — Z12.31 ENCOUNTER FOR SCREENING MAMMOGRAM FOR MALIGNANT NEOPLASM OF BREAST: ICD-10-CM

## 2024-11-06 DIAGNOSIS — E78.2 MIXED HYPERLIPIDEMIA: Primary | ICD-10-CM

## 2024-11-06 RX ORDER — ROSUVASTATIN CALCIUM 10 MG/1
10 TABLET, COATED ORAL DAILY
Qty: 90 TABLET | Refills: 3 | Status: SHIPPED | OUTPATIENT
Start: 2024-11-06

## 2024-11-12 RX ORDER — ROSUVASTATIN CALCIUM 10 MG/1
10 TABLET, COATED ORAL DAILY
Qty: 90 TABLET | OUTPATIENT
Start: 2024-11-12

## 2024-12-02 NOTE — ASSESSMENT & PLAN NOTE
Patient did have apical thrombus and placed on Xarelto.  Echo from July 2024 did reveal akinetic apex but no visible thrombus.  Patient now very stable and well compensated.  As noted previously last echocardiogram suggested modestly decreased left ventricular systolic function.  I am going to order a MUGA scan to reassess ejection fraction.  If less than 35% will need to consider referral to Dr. Castro to consider ICD therapy.  The patient will try to have the MUGA scan done before she travels to [place] for the winter.

## 2024-12-02 NOTE — ASSESSMENT & PLAN NOTE
Lipid panel done recently at the [facility] and LDL cholesterol down to 61.  Low dose rosuvastatin has been effective in reducing LDL cholesterol.

## 2024-12-05 ENCOUNTER — OFFICE VISIT (OUTPATIENT)
Dept: CARDIOLOGY | Facility: CLINIC | Age: 69
End: 2024-12-05
Payer: MEDICARE

## 2024-12-05 VITALS
HEART RATE: 76 BPM | BODY MASS INDEX: 26.79 KG/M2 | DIASTOLIC BLOOD PRESSURE: 62 MMHG | WEIGHT: 161 LBS | SYSTOLIC BLOOD PRESSURE: 92 MMHG

## 2024-12-05 DIAGNOSIS — I25.10 ASHD (ARTERIOSCLEROTIC HEART DISEASE): Primary | ICD-10-CM

## 2024-12-05 DIAGNOSIS — I50.22 CHRONIC SYSTOLIC HEART FAILURE: ICD-10-CM

## 2024-12-05 DIAGNOSIS — E78.2 MIXED HYPERLIPIDEMIA: ICD-10-CM

## 2024-12-05 PROCEDURE — 99214 OFFICE O/P EST MOD 30 MIN: CPT | Performed by: INTERNAL MEDICINE

## 2024-12-05 PROCEDURE — 1036F TOBACCO NON-USER: CPT | Performed by: INTERNAL MEDICINE

## 2024-12-05 PROCEDURE — 1159F MED LIST DOCD IN RCRD: CPT | Performed by: INTERNAL MEDICINE

## 2024-12-05 PROCEDURE — 1126F AMNT PAIN NOTED NONE PRSNT: CPT | Performed by: INTERNAL MEDICINE

## 2024-12-05 ASSESSMENT — PAIN SCALES - GENERAL: PAINLEVEL_OUTOF10: 0-NO PAIN

## 2024-12-05 ASSESSMENT — ENCOUNTER SYMPTOMS
BLURRED VISION: 0
COUGH: 0
DYSPNEA ON EXERTION: 0
DYSURIA: 0
SHORTNESS OF BREATH: 0
HEMATURIA: 0
DEPRESSION: 0
NUMBNESS: 0
PALPITATIONS: 0
OCCASIONAL FEELINGS OF UNSTEADINESS: 0
ABDOMINAL PAIN: 0
PARESTHESIAS: 0
LOSS OF SENSATION IN FEET: 1

## 2024-12-05 NOTE — PROGRESS NOTES
Subjective   Francine Amaya is a 69 y.o. female.    Chief Complaint:  Follow-up    HPI  The patient states that she feels wonderful.  No chest pain or anginal symptoms.  No unusual shortness of breath.  She went on Mounjaro and lost 63 pounds.  She is now off of the Mounjaro and she hopes she will be able to maintain the lower weight.    Review of Systems   Constitutional: Negative for malaise/fatigue.   HENT:  Negative for congestion.    Eyes:  Negative for blurred vision.   Cardiovascular:  Negative for chest pain, dyspnea on exertion and palpitations.   Respiratory:  Negative for cough and shortness of breath.    Musculoskeletal:  Negative for joint pain.   Gastrointestinal:  Negative for abdominal pain.   Genitourinary:  Negative for dysuria and hematuria.   Neurological:  Negative for numbness and paresthesias.       Objective   Constitutional:       Appearance: Not in distress.   Eyes:      Conjunctiva/sclera: Conjunctivae normal.   Neck:      Vascular: JVD normal.   Pulmonary:      Breath sounds: Normal breath sounds. No wheezing. No rhonchi. No rales.   Cardiovascular:      Normal rate. Regular rhythm.      Murmurs: There is no murmur.      No gallop.  No click. No rub.   Abdominal:      Palpations: Abdomen is soft.   Neurological:      General: No focal deficit present.      Mental Status: Alert.         Lab Review:       Assessment/Plan   The primary encounter diagnosis was ASHD (arteriosclerotic heart disease). Diagnoses of Chronic systolic heart failure and Mixed hyperlipidemia were also pertinent to this visit.    Mixed hyperlipidemia  Lipid panel done recently at the [facility] and LDL cholesterol down to 61.  Low dose rosuvastatin has been effective in reducing LDL cholesterol.    Chronic systolic heart failure (Multi)  Patient did have apical thrombus and placed on Xarelto.  Echo from July 2024 did reveal akinetic apex but no visible thrombus.  Patient now very stable and well compensated.  As  noted previously last echocardiogram suggested modestly decreased left ventricular systolic function.  I am going to order a MUGA scan to reassess ejection fraction.  If less than 35% will need to consider referral to Dr. Castro to consider ICD therapy.  The patient will try to have the MUGA scan done before she travels to [place] for the winter.    ASHD (arteriosclerotic heart disease)  Stable with no anginal type symptoms.  With the reduced left ventricular systolic function on last echocardiogram I am going to order a MUGA scan to reassess left ventricular ejection fraction.  The patient will be traveling to [place] for the winter and she will try to get the scan done before she leaves.  She will call me the day after the procedure to review the results.  If she cannot get it scheduled before leaving she will get the study done in [place] and try to have the results faxed to me for review.

## 2024-12-05 NOTE — ASSESSMENT & PLAN NOTE
Stable with no anginal type symptoms.  With the reduced left ventricular systolic function on last echocardiogram I am going to order a MUGA scan to reassess left ventricular ejection fraction.  The patient will be traveling to [place] for the winter and she will try to get the scan done before she leaves.  She will call me the day after the procedure to review the results.  If she cannot get it scheduled before leaving she will get the study done in [place] and try to have the results faxed to me for review.

## 2024-12-09 ENCOUNTER — HOSPITAL ENCOUNTER (OUTPATIENT)
Dept: RADIOLOGY | Facility: HOSPITAL | Age: 69
Discharge: HOME | End: 2024-12-09
Payer: MEDICARE

## 2024-12-09 ENCOUNTER — TELEPHONE (OUTPATIENT)
Dept: CARDIOLOGY | Facility: CLINIC | Age: 69
End: 2024-12-09
Payer: MEDICARE

## 2024-12-09 DIAGNOSIS — I50.22 CHRONIC SYSTOLIC HEART FAILURE: ICD-10-CM

## 2024-12-09 PROCEDURE — 3430000001 HC RX 343 DIAGNOSTIC RADIOPHARMACEUTICALS: Performed by: INTERNAL MEDICINE

## 2024-12-09 PROCEDURE — 78472 GATED HEART PLANAR SINGLE: CPT | Performed by: STUDENT IN AN ORGANIZED HEALTH CARE EDUCATION/TRAINING PROGRAM

## 2024-12-09 PROCEDURE — 78472 GATED HEART PLANAR SINGLE: CPT

## 2024-12-09 PROCEDURE — A9560 TC99M LABELED RBC: HCPCS | Performed by: INTERNAL MEDICINE

## 2024-12-10 ENCOUNTER — TELEPHONE (OUTPATIENT)
Dept: CARDIOLOGY | Facility: CLINIC | Age: 69
End: 2024-12-10

## 2024-12-10 ENCOUNTER — TELEPHONE (OUTPATIENT)
Dept: CARDIOLOGY | Facility: CLINIC | Age: 69
End: 2024-12-10
Payer: MEDICARE

## 2024-12-10 NOTE — TELEPHONE ENCOUNTER
Pt called the office requesting a call back anytime after 11:00am  Pt stated she missed you call yesterday  Please advice   Good call back number  [phone]

## 2024-12-10 NOTE — TELEPHONE ENCOUNTER
Reviewed MUGA scan with patient over phone.  EF 29%, I have recommended seeing Dr. Castro to discuss ICD.  Patient upset, states she feels so much better now compared to last year, yet heart function worse.  Long discussion, she now agrees to see Dr. Castro in consultation, phone number provided.

## 2024-12-12 ENCOUNTER — TELEPHONE (OUTPATIENT)
Dept: CARDIOLOGY | Facility: CLINIC | Age: 69
End: 2024-12-12
Payer: MEDICARE

## 2024-12-12 NOTE — TELEPHONE ENCOUNTER
Called patient, she is wanting to know if her EF is so bad that she should stay home from vacation and see Dr. Castro on 12/31. Patient upset that she can't be seen prior to 12/30. Will reach out to Dr. Castro and see if there is any way patient can be scheduled prior to 12/30

## 2024-12-17 ENCOUNTER — APPOINTMENT (OUTPATIENT)
Dept: CARDIOLOGY | Facility: CLINIC | Age: 69
End: 2024-12-17
Payer: MEDICARE

## 2024-12-20 ENCOUNTER — APPOINTMENT (OUTPATIENT)
Dept: OBSTETRICS AND GYNECOLOGY | Facility: CLINIC | Age: 69
End: 2024-12-20
Payer: MEDICARE

## 2024-12-23 ENCOUNTER — HOSPITAL ENCOUNTER (OUTPATIENT)
Dept: RADIOLOGY | Facility: CLINIC | Age: 69
Discharge: HOME | End: 2024-12-23
Payer: MEDICARE

## 2024-12-23 VITALS — BODY MASS INDEX: 26.66 KG/M2 | WEIGHT: 160 LBS | HEIGHT: 65 IN

## 2024-12-23 DIAGNOSIS — Z12.31 ENCOUNTER FOR SCREENING MAMMOGRAM FOR MALIGNANT NEOPLASM OF BREAST: ICD-10-CM

## 2024-12-23 PROCEDURE — 77067 SCR MAMMO BI INCL CAD: CPT

## 2024-12-24 ENCOUNTER — OFFICE VISIT (OUTPATIENT)
Dept: OBSTETRICS AND GYNECOLOGY | Facility: CLINIC | Age: 69
End: 2024-12-24
Payer: MEDICARE

## 2024-12-24 VITALS — WEIGHT: 160 LBS | BODY MASS INDEX: 26.66 KG/M2 | HEIGHT: 65 IN

## 2024-12-24 DIAGNOSIS — N95.1 MENOPAUSAL SYMPTOMS: Primary | ICD-10-CM

## 2024-12-24 DIAGNOSIS — Z12.31 ENCOUNTER FOR SCREENING MAMMOGRAM FOR MALIGNANT NEOPLASM OF BREAST: ICD-10-CM

## 2024-12-24 PROCEDURE — 3008F BODY MASS INDEX DOCD: CPT

## 2024-12-24 PROCEDURE — 1160F RVW MEDS BY RX/DR IN RCRD: CPT

## 2024-12-24 PROCEDURE — 99213 OFFICE O/P EST LOW 20 MIN: CPT

## 2024-12-24 PROCEDURE — 1036F TOBACCO NON-USER: CPT

## 2024-12-24 PROCEDURE — 1159F MED LIST DOCD IN RCRD: CPT

## 2024-12-24 PROCEDURE — 1126F AMNT PAIN NOTED NONE PRSNT: CPT

## 2024-12-24 RX ORDER — HYDROXYCHLOROQUINE SULFATE 200 MG/1
1 TABLET, FILM COATED ORAL 2 TIMES DAILY
COMMUNITY

## 2024-12-24 ASSESSMENT — ENCOUNTER SYMPTOMS
DIZZINESS: 0
CHILLS: 0
OCCASIONAL FEELINGS OF UNSTEADINESS: 0
ABDOMINAL PAIN: 0
UNEXPECTED WEIGHT CHANGE: 0
FATIGUE: 0
HEADACHES: 0
SHORTNESS OF BREATH: 0
COUGH: 0
COLOR CHANGE: 0
DYSURIA: 0
NAUSEA: 0
VOMITING: 0
LOSS OF SENSATION IN FEET: 0
DEPRESSION: 0
FEVER: 0

## 2024-12-24 ASSESSMENT — PAIN SCALES - GENERAL: PAINLEVEL_OUTOF10: 0-NO PAIN

## 2024-12-24 ASSESSMENT — LIFESTYLE VARIABLES
AUDIT-C TOTAL SCORE: 1
HOW OFTEN DO YOU HAVE SIX OR MORE DRINKS ON ONE OCCASION: NEVER
HOW MANY STANDARD DRINKS CONTAINING ALCOHOL DO YOU HAVE ON A TYPICAL DAY: 1 OR 2
HOW OFTEN DO YOU HAVE A DRINK CONTAINING ALCOHOL: MONTHLY OR LESS
SKIP TO QUESTIONS 9-10: 1

## 2024-12-24 ASSESSMENT — PATIENT HEALTH QUESTIONNAIRE - PHQ9
SUM OF ALL RESPONSES TO PHQ9 QUESTIONS 1 & 2: 0
1. LITTLE INTEREST OR PLEASURE IN DOING THINGS: NOT AT ALL
2. FEELING DOWN, DEPRESSED OR HOPELESS: NOT AT ALL

## 2024-12-24 NOTE — PROGRESS NOTES
"Subjective   Francine Amaya is a 69 y.o. female who is here for a routine menopausal visit. Last saw Dr. Moscoso 2023.    -Denies vaginal bleeding, pelvic pain, pressure, or bloating.  -Denies breast or vaginal concerns.   -Down 60 lbs from last year using GLP1; feeling great; she is now utilizing WW for healthier lifestyle s/p MI.    Complaints:   none  HRT: no  History of abnormal Pap smear: no  History of abnormal mammogram: no      OB History          2    Para   2    Term   2            AB        Living             SAB        IAB        Ectopic        Multiple        Live Births                      Review of Systems   Constitutional:  Negative for chills, fatigue, fever and unexpected weight change.   Respiratory:  Negative for cough and shortness of breath.    Gastrointestinal:  Negative for abdominal pain, nausea and vomiting.   Genitourinary:  Negative for dyspareunia, dysuria, pelvic pain and vaginal discharge.   Skin:  Negative for color change and rash.   Neurological:  Negative for dizziness and headaches.       Objective   Ht 1.651 m (5' 5\")   Wt 72.6 kg (160 lb)   BMI 26.63 kg/m²        General:   Alert and oriented, in no acute distress   Neck: Supple. No visible thyromegaly.    Breast/Axilla: Normal to palpation bilaterally without masses, skin changes, or nipple discharge.    Abdomen: Soft, non-tender, without masses or organomegaly   Vulva: Normal architecture without erythema, masses, or lesions.    Vagina: Normal mucosa without lesions, masses, or atrophy. No abnormal vaginal discharge.    Cervix: Normal without masses, lesions, or signs of cervicitis   Uterus: Normal, mobile, non-enlarged uterus   Adnexa: Normal without masses or lesions   Pelvic Floor Normal    Psych Normal affect. Normal mood.      Assessment/Plan   Diagnoses and all orders for this visit:  Menopausal symptoms   - No further need for pap smear based on age and history.   - UTD on colonoscopy.    - UTD on " DEXA 6/2023 normal BMD.  Encounter for screening mammogram for malignant neoplasm of breast  - Mammogram completed yesterday; results in process.     Chitra Ulloa PA-C

## 2024-12-26 ENCOUNTER — OFFICE VISIT (OUTPATIENT)
Dept: CARDIOLOGY | Facility: CLINIC | Age: 69
End: 2024-12-26
Payer: MEDICARE

## 2024-12-26 ENCOUNTER — APPOINTMENT (OUTPATIENT)
Dept: CARDIOLOGY | Facility: CLINIC | Age: 69
End: 2024-12-26
Payer: MEDICARE

## 2024-12-26 VITALS — WEIGHT: 160 LBS | DIASTOLIC BLOOD PRESSURE: 62 MMHG | SYSTOLIC BLOOD PRESSURE: 116 MMHG | BODY MASS INDEX: 26.63 KG/M2

## 2024-12-26 DIAGNOSIS — I50.22 CHRONIC SYSTOLIC HEART FAILURE: Primary | ICD-10-CM

## 2024-12-26 PROBLEM — I25.5 ISCHEMIC CARDIOMYOPATHY: Status: ACTIVE | Noted: 2024-12-26

## 2024-12-26 PROCEDURE — 93005 ELECTROCARDIOGRAM TRACING: CPT | Performed by: INTERNAL MEDICINE

## 2024-12-26 PROCEDURE — 99214 OFFICE O/P EST MOD 30 MIN: CPT | Mod: 25 | Performed by: INTERNAL MEDICINE

## 2024-12-26 RX ORDER — METOPROLOL SUCCINATE 25 MG/1
TABLET, EXTENDED RELEASE ORAL
Qty: 180 TABLET | Refills: 3 | Status: SHIPPED | OUTPATIENT
Start: 2024-12-26

## 2024-12-26 ASSESSMENT — ENCOUNTER SYMPTOMS
OCCASIONAL FEELINGS OF UNSTEADINESS: 0
DEPRESSION: 0
LOSS OF SENSATION IN FEET: 0

## 2024-12-26 ASSESSMENT — PATIENT HEALTH QUESTIONNAIRE - PHQ9
1. LITTLE INTEREST OR PLEASURE IN DOING THINGS: NOT AT ALL
SUM OF ALL RESPONSES TO PHQ9 QUESTIONS 1 AND 2: 0
2. FEELING DOWN, DEPRESSED OR HOPELESS: NOT AT ALL

## 2024-12-26 ASSESSMENT — PAIN SCALES - GENERAL: PAINLEVEL_OUTOF10: 0-NO PAIN

## 2024-12-26 ASSESSMENT — COLUMBIA-SUICIDE SEVERITY RATING SCALE - C-SSRS
1. IN THE PAST MONTH, HAVE YOU WISHED YOU WERE DEAD OR WISHED YOU COULD GO TO SLEEP AND NOT WAKE UP?: NO
6. HAVE YOU EVER DONE ANYTHING, STARTED TO DO ANYTHING, OR PREPARED TO DO ANYTHING TO END YOUR LIFE?: NO
2. HAVE YOU ACTUALLY HAD ANY THOUGHTS OF KILLING YOURSELF?: NO

## 2024-12-26 NOTE — ASSESSMENT & PLAN NOTE
I would recommend reinitiation of beta-blocker therapy and up titration up to maximally tolerated doses with repeat assessment of heart function thereafter prior to consideration of an ICD.  She has not had any episodes of syncope and this would be done solely for primary prevention of sudden cardiac death.  She knows to call us with blood pressure and heart rate readings in the coming weeks so we can uptitrate her medication as tolerated.

## 2024-12-26 NOTE — PROGRESS NOTES
No chief complaint on file.           The patient is a 69-year-old female with a history of an ischemic cardiomyopathy post MI and PCI about 3 years ago.  She was initially treated with beta-blockers and afterload reduction but her beta-blocker was discontinued about a year ago.  She has had some fluctuation in her ejection fraction based on echocardiography as well as gated imaging on nuclear stress test.  Her most recent MUGA scan just a couple weeks ago showed ejection fraction of 29%.  She happily has lost over 60 pounds over the past year and has become more physically active with walking, swimming and using a stationary bicycle.  She does all these activities without any difficulty whatsoever.  She has not had syncope or near syncope.         Active Ambulatory Problems     Diagnosis Date Noted    Abnormal laboratory test 10/06/2020    Acute myocardial infarction of anterior wall (Multi) 09/03/2023    Age-related osteoporosis without current pathological fracture 09/03/2023    ASHD (arteriosclerotic heart disease) 09/03/2023    Chronic systolic heart failure 09/03/2023    Cobalamin deficiency 02/17/2020    Degenerative arthritis of thumb, right 09/03/2023    Elevated LDL cholesterol level 02/18/2021    Elevated sed rate 10/06/2020    Mixed hyperlipidemia 09/03/2023    Obesity 09/03/2023    Polyarthralgia 10/02/2020    Rheumatoid arthritis 09/03/2023    Vitamin D deficiency 02/17/2020    Apical mural thrombus 12/18/2023    Acute cystitis without hematuria 12/18/2023    Female climacteric state 12/18/2023    Dysuria 12/18/2023     Resolved Ambulatory Problems     Diagnosis Date Noted    No Resolved Ambulatory Problems     Past Medical History:   Diagnosis Date    Ear pain, referred, right     Heart attack     Hypertension         Review of Systems   All other systems reviewed and are negative.       Objective     There were no vitals filed for this visit.     Vitals and nursing note reviewed.   Constitutional:        Appearance: Healthy appearance.   HENT:    Mouth/Throat:      Pharynx: Oropharynx is clear.   Pulmonary:      Effort: Pulmonary effort is normal.      Breath sounds: Normal breath sounds.   Cardiovascular:      PMI at left midclavicular line. Normal rate. Regular rhythm. Normal S1. Normal S2.       Murmurs: There is a grade 1/6 mid frequency blowing holosystolic murmur.      No gallop.  No click. No rub.   Pulses:     Intact distal pulses.   Edema:     Peripheral edema absent.   Abdominal:      General: Bowel sounds are normal.   Musculoskeletal:      Cervical back: Normal range of motion. Skin:     General: Skin is warm and dry.   Neurological:      General: No focal deficit present.      Mental Status: Alert and oriented to person, place and time.            Lab Review:   No visits with results within 2 Month(s) from this visit.   Latest known visit with results is:   Hospital Outpatient Visit on 07/25/2024   Component Date Value    AV pk sue 07/25/2024 1.46     LVOT diam 07/25/2024 2.07     AV mn grad 07/25/2024 4.8     MV E/A ratio 07/25/2024 0.70     Tricuspid annular plane * 07/25/2024 2.0     LV Biplane EF 07/25/2024 33     LA vol index A/L 07/25/2024 33.0     MV avg E/e' ratio 07/25/2024 14.01     LV EF 07/25/2024 33     RV free wall pk S' 07/25/2024 12.15     LVIDd 07/25/2024 6.12     AV pk grad 07/25/2024 8.6     Aortic Valve Area by Con* 07/25/2024 1.93     Aortic Valve Area by Con* 07/25/2024 1.81     LV A4C EF 07/25/2024 35.3        ECG:  Normal sinus rhythm at 64 bpm  ms QS duration 100 ms QTc 440 ms old septal lateral infarct.    Assessment/Plan:  I would recommend reinitiation of beta-blocker therapy and up titration up to maximally tolerated doses with repeat assessment of heart function thereafter prior to consideration of an ICD.  She has not had any episodes of syncope and this would be done solely for primary prevention of sudden cardiac death.  She knows to call us with blood  pressure and heart rate readings in the coming weeks so we can uptitrate her medication as tolerated.    Problem List Items Addressed This Visit       Chronic systolic heart failure - Primary    Relevant Orders    ECG 12 lead (Clinic Performed)

## 2024-12-31 ENCOUNTER — APPOINTMENT (OUTPATIENT)
Dept: CARDIOLOGY | Facility: CLINIC | Age: 69
End: 2024-12-31
Payer: MEDICARE

## 2025-02-06 DIAGNOSIS — I50.22 CHRONIC SYSTOLIC HEART FAILURE: Primary | ICD-10-CM

## 2025-02-06 DIAGNOSIS — I25.5 ISCHEMIC CARDIOMYOPATHY: ICD-10-CM

## 2025-02-18 ENCOUNTER — TELEPHONE (OUTPATIENT)
Dept: CARDIOLOGY | Facility: CLINIC | Age: 70
End: 2025-02-18
Payer: MEDICARE

## 2025-02-24 ENCOUNTER — TELEPHONE (OUTPATIENT)
Dept: CARDIOLOGY | Facility: CLINIC | Age: 70
End: 2025-02-24
Payer: MEDICARE

## 2025-02-24 DIAGNOSIS — I50.22 CHRONIC SYSTOLIC HEART FAILURE: ICD-10-CM

## 2025-02-24 DIAGNOSIS — I25.10 ASHD (ARTERIOSCLEROTIC HEART DISEASE): ICD-10-CM

## 2025-02-24 RX ORDER — LOSARTAN POTASSIUM 50 MG/1
50 TABLET ORAL DAILY
Qty: 90 TABLET | Refills: 3 | Status: SHIPPED | OUTPATIENT
Start: 2025-02-24 | End: 2026-02-24

## 2025-02-24 NOTE — TELEPHONE ENCOUNTER
Patient called about needed   to know can she get her muga test  be done in [place]  cause she going to be down there for a while  she just wants to know  does she need this test again  in the process to decide    If she needs a device I called patient back to let her know that she can get her test down in [place] if she fells ok with doing that I gave her the fax number  and inform her to make sure the office gets her test results  for the test  can we can have them when she bach to her appt

## 2025-02-27 ENCOUNTER — TELEPHONE (OUTPATIENT)
Dept: CARDIOLOGY | Facility: CLINIC | Age: 70
End: 2025-02-27
Payer: MEDICARE

## 2025-02-27 DIAGNOSIS — I25.10 ASHD (ARTERIOSCLEROTIC HEART DISEASE): ICD-10-CM

## 2025-03-03 ENCOUNTER — TELEPHONE (OUTPATIENT)
Dept: CARDIOLOGY | Facility: CLINIC | Age: 70
End: 2025-03-03
Payer: MEDICARE

## 2025-03-03 DIAGNOSIS — I25.10 ASHD (ARTERIOSCLEROTIC HEART DISEASE): ICD-10-CM

## 2025-03-03 RX ORDER — CARVEDILOL 3.12 MG/1
3.12 TABLET ORAL
Qty: 180 TABLET | Refills: 3 | Status: SHIPPED | OUTPATIENT
Start: 2025-03-03

## 2025-03-03 RX ORDER — CLOPIDOGREL BISULFATE 75 MG/1
75 TABLET ORAL DAILY
Qty: 90 TABLET | Refills: 3 | Status: SHIPPED | OUTPATIENT
Start: 2025-03-03

## 2025-03-03 NOTE — TELEPHONE ENCOUNTER
Rx Refill Request- 90 day supply    Patient is completely out    Pharmacy:     [id number] - [place], [place] - [address] AT [facility] [address]  [address] Phone: [phone]   Fax: [phone]        Medication:      Dispensed Days Supply Quantity Provider Pharmacy   CLOPIDOGREL 75MG TABLETS 11/09/2024 90 90 each Miguel A Roberts DO [facility] #...

## 2025-03-05 ENCOUNTER — TELEPHONE (OUTPATIENT)
Dept: CARDIOLOGY | Facility: CLINIC | Age: 70
End: 2025-03-05
Payer: MEDICARE

## 2025-03-17 ENCOUNTER — TELEPHONE (OUTPATIENT)
Dept: CARDIOLOGY | Facility: CLINIC | Age: 70
End: 2025-03-17
Payer: MEDICARE

## 2025-03-18 ENCOUNTER — TELEPHONE (OUTPATIENT)
Dept: CARDIOLOGY | Facility: CLINIC | Age: 70
End: 2025-03-18
Payer: MEDICARE

## 2025-03-18 NOTE — TELEPHONE ENCOUNTER
Patient called very upset  about her results  in [place] she very upset about DR Castro not looking in to her [place] muga results I told patient that I will fowared this message over the the nursing staff and have some call her tommorow when Dr Castro message back about

## 2025-03-25 ENCOUNTER — TELEPHONE (OUTPATIENT)
Dept: CARDIOLOGY | Facility: CLINIC | Age: 70
End: 2025-03-25
Payer: MEDICARE

## 2025-03-25 ENCOUNTER — APPOINTMENT (OUTPATIENT)
Dept: RADIOLOGY | Facility: HOSPITAL | Age: 70
End: 2025-03-25
Payer: MEDICARE

## 2025-03-25 NOTE — TELEPHONE ENCOUNTER
Called patient to discuss getting scheduled for SubQ ICD with Nicolas with [facility] for Mid May. Procedure was discussed previously with Dr. Castro via phone call last week. In speaking with patient she has some questions, Heather, device RN came to phone to provide information. Patient golfs 4 times a week and is concerned about recovery. States she will get a second opinion and call the office back should she wish to go through with the procedure. Did make her aware that he does his procedures on Mondays and Wednesdays at [facility] [place], operated by [facility] and that it would be a same day procedure. Made her aware that her insurance would NOT need prior authorization so she would be easier to get scheduled. She was thinking of end of May. Will await her call back to get scheduled. Aware to call the office back should any questions or issues arise in the meantime

## 2025-04-08 ENCOUNTER — TELEPHONE (OUTPATIENT)
Facility: CLINIC | Age: 70
End: 2025-04-08
Payer: MEDICARE

## 2025-04-09 DIAGNOSIS — I25.10 ASHD (ARTERIOSCLEROTIC HEART DISEASE): ICD-10-CM

## 2025-04-09 RX ORDER — CARVEDILOL 3.12 MG/1
3.12 TABLET ORAL
Qty: 180 TABLET | Refills: 3 | Status: SHIPPED | OUTPATIENT
Start: 2025-04-09

## 2025-05-29 ENCOUNTER — OFFICE VISIT (OUTPATIENT)
Facility: CLINIC | Age: 70
End: 2025-05-29
Payer: MEDICARE

## 2025-05-29 VITALS — DIASTOLIC BLOOD PRESSURE: 74 MMHG | SYSTOLIC BLOOD PRESSURE: 134 MMHG

## 2025-05-29 DIAGNOSIS — I25.5 ISCHEMIC CARDIOMYOPATHY: Primary | ICD-10-CM

## 2025-05-29 DIAGNOSIS — I25.10 ASHD (ARTERIOSCLEROTIC HEART DISEASE): ICD-10-CM

## 2025-05-29 DIAGNOSIS — I50.22 CHRONIC SYSTOLIC HEART FAILURE: ICD-10-CM

## 2025-05-29 PROCEDURE — 99213 OFFICE O/P EST LOW 20 MIN: CPT | Performed by: INTERNAL MEDICINE

## 2025-05-29 PROCEDURE — 1159F MED LIST DOCD IN RCRD: CPT | Performed by: INTERNAL MEDICINE

## 2025-05-29 PROCEDURE — G2211 COMPLEX E/M VISIT ADD ON: HCPCS | Performed by: INTERNAL MEDICINE

## 2025-05-29 PROCEDURE — 1036F TOBACCO NON-USER: CPT | Performed by: INTERNAL MEDICINE

## 2025-05-29 RX ORDER — CARVEDILOL 6.25 MG/1
6.25 TABLET ORAL
Qty: 180 TABLET | Refills: 3 | Status: SHIPPED | OUTPATIENT
Start: 2025-05-29

## 2025-05-29 ASSESSMENT — ENCOUNTER SYMPTOMS
DEPRESSION: 0
LOSS OF SENSATION IN FEET: 0
OCCASIONAL FEELINGS OF UNSTEADINESS: 0

## 2025-05-29 ASSESSMENT — COLUMBIA-SUICIDE SEVERITY RATING SCALE - C-SSRS
6. HAVE YOU EVER DONE ANYTHING, STARTED TO DO ANYTHING, OR PREPARED TO DO ANYTHING TO END YOUR LIFE?: NO
2. HAVE YOU ACTUALLY HAD ANY THOUGHTS OF KILLING YOURSELF?: NO
1. IN THE PAST MONTH, HAVE YOU WISHED YOU WERE DEAD OR WISHED YOU COULD GO TO SLEEP AND NOT WAKE UP?: NO

## 2025-05-29 ASSESSMENT — PATIENT HEALTH QUESTIONNAIRE - PHQ9
2. FEELING DOWN, DEPRESSED OR HOPELESS: NOT AT ALL
1. LITTLE INTEREST OR PLEASURE IN DOING THINGS: NOT AT ALL
SUM OF ALL RESPONSES TO PHQ9 QUESTIONS 1 AND 2: 0

## 2025-05-29 NOTE — PROGRESS NOTES
No chief complaint on file.       The patient is a 70-year-old female with a history of an ischemic cardiomyopathy post MI and PCI about 3 years ago.  She has had an ejection fraction below 35% despite guideline medically directed therapy.  I saw her last year we uptitrated her medications further and she had another assessment while in [place].  She is here to further discuss defibrillator implantation.  She remains very active with swimming 45 minutes almost on a daily basis and walking regularly without difficulty.  She wishes to further delay device implantation with further assessments as we optimize her medical regimen.           Active Ambulatory Problems     Diagnosis Date Noted    Abnormal laboratory test 10/06/2020    Acute myocardial infarction of anterior wall (Multi) 09/03/2023    Age-related osteoporosis without current pathological fracture 09/03/2023    ASHD (arteriosclerotic heart disease) 09/03/2023    Chronic systolic heart failure 09/03/2023    Cobalamin deficiency 02/17/2020    Degenerative arthritis of thumb, right 09/03/2023    Elevated LDL cholesterol level 02/18/2021    Elevated sed rate 10/06/2020    Mixed hyperlipidemia 09/03/2023    Obesity 09/03/2023    Polyarthralgia 10/02/2020    Rheumatoid arthritis 09/03/2023    Vitamin D deficiency 02/17/2020    Apical mural thrombus 12/18/2023    Acute cystitis without hematuria 12/18/2023    Female climacteric state 12/18/2023    Dysuria 12/18/2023    Ischemic cardiomyopathy 12/26/2024     Resolved Ambulatory Problems     Diagnosis Date Noted    No Resolved Ambulatory Problems     Past Medical History:   Diagnosis Date    Ear pain, referred, right     Heart attack     Hypertension         Review of Systems   All other systems reviewed and are negative.         Current Outpatient Medications   Medication Instructions    carvedilol (COREG) 3.125 mg, oral, 2 times daily (morning and late afternoon)    celecoxib (CeleBREX) 200 mg capsule 1 capsule,  Daily PRN    cholecalciferol (VITAMIN D-3) 2,000 Units, Daily    clopidogrel (PLAVIX) 75 mg, oral, Daily    hydroxychloroquine (Plaquenil) 200 mg tablet 1 tablet, 2 times daily    losartan (COZAAR) 50 mg, oral, Daily    rivaroxaban (XARELTO) 20 mg, oral, Daily with evening meal, Take with food.    rosuvastatin (CRESTOR) 10 mg, oral, Daily       Objective     There were no vitals filed for this visit.     Vitals and nursing note reviewed.   Constitutional:       Appearance: Healthy appearance.   HENT:    Mouth/Throat:      Pharynx: Oropharynx is clear.   Pulmonary:      Effort: Pulmonary effort is normal.      Breath sounds: Normal breath sounds.   Cardiovascular:      PMI at left midclavicular line. Normal rate. Regular rhythm. Normal S1. Normal S2.       Murmurs: There is a grade 1/6 holosystolic murmur.      No gallop.  No click. No rub.   Pulses:     Intact distal pulses.   Edema:     Peripheral edema absent.   Abdominal:      General: Bowel sounds are normal.   Musculoskeletal:      Cervical back: Normal range of motion. Skin:     General: Skin is warm and dry.   Neurological:      General: No focal deficit present.      Mental Status: Alert and oriented to person, place and time.            Lab Review:   No visits with results within 2 Month(s) from this visit.   Latest known visit with results is:   Hospital Outpatient Visit on 07/25/2024   Component Date Value    AV pk sue 07/25/2024 1.46     LVOT diam 07/25/2024 2.07     AV mn grad 07/25/2024 4.8     MV E/A ratio 07/25/2024 0.70     Tricuspid annular plane * 07/25/2024 2.0     LV Biplane EF 07/25/2024 33     LA vol index A/L 07/25/2024 33.0     MV avg E/e' ratio 07/25/2024 14.01     LV EF 07/25/2024 33     RV free wall pk S' 07/25/2024 12.15     LVIDd 07/25/2024 6.12     AV pk grad 07/25/2024 8.6     Aortic Valve Area by Con* 07/25/2024 1.93     Aortic Valve Area by Con* 07/25/2024 1.81     LV A4C EF 07/25/2024 35.3                   Assessment/plan:  Ischemic cardiomyopathy  Patient has a severe ischemic heart myopathy although she has had minimal improvement.  She was 61 further work with medications at this time and delayed device implantation for primary prevention of sudden cardiac death.  I would increase her carvedilol to 6.25 mg twice a day at this juncture.  Follow-up MUGA scan in 3 months.  Problem List Items Addressed This Visit       Chronic systolic heart failure    Ischemic cardiomyopathy - Primary      Joey Castro MD

## 2025-05-29 NOTE — ASSESSMENT & PLAN NOTE
Ischemic cardiomyopathy  Patient has a severe ischemic heart myopathy although she has had minimal improvement.  She was 61 further work with medications at this time and delayed device implantation for primary prevention of sudden cardiac death.  I would increase her carvedilol to 6.25 mg twice a day at this juncture.  Follow-up MUGA scan in 3 months.

## 2025-06-03 ENCOUNTER — TELEPHONE (OUTPATIENT)
Facility: CLINIC | Age: 70
End: 2025-06-03
Payer: MEDICARE

## 2025-06-03 NOTE — TELEPHONE ENCOUNTER
VM received requesting a call back from the RN in regard to a medication question. Patient can be reached at [phone]

## 2025-06-04 ENCOUNTER — TELEPHONE (OUTPATIENT)
Facility: CLINIC | Age: 70
End: 2025-06-04
Payer: MEDICARE

## 2025-06-20 ENCOUNTER — OFFICE VISIT (OUTPATIENT)
Dept: URGENT CARE | Age: 70
End: 2025-06-20
Payer: MEDICARE

## 2025-06-20 VITALS
WEIGHT: 155 LBS | SYSTOLIC BLOOD PRESSURE: 89 MMHG | TEMPERATURE: 98 F | RESPIRATION RATE: 20 BRPM | HEIGHT: 65 IN | BODY MASS INDEX: 25.83 KG/M2 | OXYGEN SATURATION: 98 % | HEART RATE: 68 BPM | DIASTOLIC BLOOD PRESSURE: 58 MMHG

## 2025-06-20 DIAGNOSIS — J06.9 ACUTE UPPER RESPIRATORY INFECTION: ICD-10-CM

## 2025-06-20 DIAGNOSIS — H65.93 BILATERAL NON-SUPPURATIVE OTITIS MEDIA: Primary | ICD-10-CM

## 2025-06-20 RX ORDER — DOXYCYCLINE HYCLATE 100 MG
100 TABLET ORAL 2 TIMES DAILY
Qty: 14 TABLET | Refills: 0 | Status: SHIPPED | OUTPATIENT
Start: 2025-06-20 | End: 2025-06-27

## 2025-06-20 ASSESSMENT — ENCOUNTER SYMPTOMS
RESPIRATORY NEGATIVE: 1
EYE DISCHARGE: 0
CONSTITUTIONAL NEGATIVE: 1

## 2025-06-20 NOTE — PROGRESS NOTES
"Subjective   Patient ID: Francine Amaya is a 70 y.o. female. They present today with a chief complaint of Illness (Sinus congestion - Entered by patient).    History of Present Illness    Illness  Associated symptoms: congestion and ear pain        Past Medical History  Allergies as of 06/20/2025 - Reviewed 06/20/2025   Allergen Reaction Noted    Amoxicillin Unknown 09/03/2023    Ciprofloxacin Unknown 09/03/2023    Meperidine Unknown 09/03/2023    Sulfa (sulfonamide antibiotics) Unknown 09/03/2023    Sulfamethoxazole-trimethoprim Hives 09/03/2023       Prescriptions Prior to Admission[1]     Medical History[2]    Surgical History[3]     reports that she has never smoked. She has never used smokeless tobacco. She reports current alcohol use. She reports current drug use. Drug: Marijuana.    Review of Systems  Review of Systems   Constitutional: Negative.    HENT:  Positive for congestion and ear pain.    Eyes:  Negative for discharge.   Respiratory: Negative.                                    Objective    Vitals:    06/20/25 1427   BP: 89/58   Pulse: 68   Resp: 20   Temp: 36.7 °C (98 °F)   TempSrc: Oral   SpO2: 98%   Weight: 70.3 kg (155 lb)   Height: 1.651 m (5' 5\")     No LMP recorded. Patient is postmenopausal.    Physical Exam  HENT:      Right Ear: Tympanic membrane is bulging.      Left Ear: Tympanic membrane is bulging.      Nose: Congestion present.      Mouth/Throat:      Pharynx: Posterior oropharyngeal erythema present.   Eyes:      Conjunctiva/sclera: Conjunctivae normal.   Neurological:      Mental Status: She is alert.         Procedures    Point of Care Test & Imaging Results from this visit  No results found for this visit on 06/20/25.   Imaging  No results found.    Cardiology, Vascular, and Other Imaging  No other imaging results found for the past 2 days      Diagnostic study results (if any) were reviewed by Heather Mccullough MD.    Assessment/Plan   Allergies, medications, history, and pertinent " labs/EKGs/Imaging reviewed by Heather Mccullough MD.     Medical Decision Making   URI and bilateral serous OM, will not take Antibiotics unless her ear pain gets worse.    Orders and Diagnoses  There are no diagnoses linked to this encounter.    Medical Admin Record      Patient disposition: Home    Electronically signed by Heather Mccullough MD  3:45 PM           [1] (Not in a hospital admission)  [2]   Past Medical History:  Diagnosis Date    Ear pain, referred, right     Heart attack     Hypertension    [3]   Past Surgical History:  Procedure Laterality Date    BREAST BIOPSY Left     left breast biopsy negative    BUNIONECTOMY Bilateral     CAROTID STENT       SECTION, CLASSIC      IR INJECTION NERVE BLOCK      KIDNEY STONE SURGERY      KNEE SURGERY Right

## 2025-07-31 ENCOUNTER — OFFICE VISIT (OUTPATIENT)
Facility: CLINIC | Age: 70
End: 2025-07-31
Payer: MEDICARE

## 2025-07-31 VITALS
HEART RATE: 85 BPM | WEIGHT: 151.5 LBS | BODY MASS INDEX: 25.21 KG/M2 | DIASTOLIC BLOOD PRESSURE: 78 MMHG | OXYGEN SATURATION: 100 % | SYSTOLIC BLOOD PRESSURE: 122 MMHG

## 2025-07-31 DIAGNOSIS — I25.5 ISCHEMIC CARDIOMYOPATHY: Primary | ICD-10-CM

## 2025-07-31 PROCEDURE — 99214 OFFICE O/P EST MOD 30 MIN: CPT | Performed by: INTERNAL MEDICINE

## 2025-07-31 PROCEDURE — 1126F AMNT PAIN NOTED NONE PRSNT: CPT | Performed by: INTERNAL MEDICINE

## 2025-07-31 PROCEDURE — 1159F MED LIST DOCD IN RCRD: CPT | Performed by: INTERNAL MEDICINE

## 2025-07-31 PROCEDURE — 1036F TOBACCO NON-USER: CPT | Performed by: INTERNAL MEDICINE

## 2025-07-31 PROCEDURE — 99211 OFF/OP EST MAY X REQ PHY/QHP: CPT

## 2025-07-31 PROCEDURE — G2211 COMPLEX E/M VISIT ADD ON: HCPCS | Performed by: INTERNAL MEDICINE

## 2025-07-31 ASSESSMENT — ENCOUNTER SYMPTOMS
OCCASIONAL FEELINGS OF UNSTEADINESS: 0
LOSS OF SENSATION IN FEET: 0
DEPRESSION: 0

## 2025-07-31 ASSESSMENT — PAIN SCALES - GENERAL: PAINLEVEL_OUTOF10: 0-NO PAIN

## 2025-07-31 NOTE — ASSESSMENT & PLAN NOTE
Will repeat her echocardiogram for updated reassessment of left and right heart function and see if her ischemic cardiomyopathy has improved.  If left ventricular ejection fraction is 35% or less it would be reasonable to recommend ICD implantation for primary prevention of SCD.

## 2025-07-31 NOTE — PROGRESS NOTES
[facility] [place]        Subjective   Chief Complaint   Patient presents with    Establish Care      Seen as a referral from her electrophysiologist, Dr. Castro who follows her because of an ischemic cardiomyopathy.  She had previous anterior wall myocardial infarction and subsequent LAD PCI in  and was noted to have left ventricular ejection fraction of 35% at that time she had repeat echocardiography 2024 which continued to show anterior and apical hypokinesis with left ventricular ejection fraction 30 to 35%.  He had mild to moderate mitral valve regurgitation.      she has been treated with guideline directed medical therapy including losartan and carvedilol and has refused ICD implantation so far.    She has a past medical history of Ear pain, referred, right, Heart attack, and Hypertension.  She has a past surgical history that includes Kidney stone surgery; Knee surgery (Right); IR injection nerve block;  section, classic; Bunionectomy (Bilateral); Carotid stent; Breast biopsy (Left); and Coronary stent placement ().   No relevant family history has been documented for this patient.  Current Outpatient Medications   Medication Sig Dispense Refill    carvedilol (Coreg) 6.25 mg tablet Take 1 tablet (6.25 mg) by mouth 2 times daily (morning and late afternoon). 180 tablet 3    celecoxib (CeleBREX) 200 mg capsule Take 1 capsule (200 mg) by mouth once daily as needed.      cholecalciferol (Vitamin D-3) 50 MCG (2000 UT) tablet Take 1 tablet (2,000 Units) by mouth once daily.      clopidogrel (Plavix) 75 mg tablet Take 1 tablet (75 mg) by mouth once daily. 90 tablet 3    hydroxychloroquine (Plaquenil) 200 mg tablet Take 1 tablet (200 mg) by mouth 2 times a day.      losartan (Cozaar) 50 mg tablet Take 1 tablet (50 mg) by mouth once daily. 90 tablet 3    rivaroxaban (Xarelto) 20 mg tablet Take 1 tablet (20 mg) by mouth once daily in the  evening. Take with meals. Take with food. (Patient not taking: Reported on 12/24/2024) 30 tablet 5    rosuvastatin (Crestor) 10 mg tablet Take 1 tablet (10 mg) by mouth once daily. 90 tablet 3     No current facility-administered medications for this visit.      reports that she has never smoked. She has never used smokeless tobacco. She reports current alcohol use. She reports current drug use. Drug: Marijuana.  Allergies:  Amoxicillin, Ciprofloxacin, Meperidine, Sulfa (sulfonamide antibiotics), and Sulfamethoxazole-trimethoprim    ROS: See HPI  CONSTITUTIONAL: Chills- none. Fever- none. Weight change appropriate for age.  HEENT: Headache- Negative.  Change in vision- none.  Ear pain- none. Nasal congestion- none. Post-nasal drip-none.  Sore throat-none.  CARDIOLOGY: Chest pain- none.  Leg edema-trace.  Murmurs-soft systolic.  Palpitation- none.  RESPIRATORY: Denies any shortness of breath.  GI: Abdominal pain- none.  Change in bowel habits- none.  Constipation- none.  Diarrhea- none.  Nausea- none.  Vomiting- none.  MUSCULOSKELETAL: Joint pain- none.  Muscle aches- none.  DERMATOLOGY: Rash- none.  NEUROLOGY: Dizziness- none.   Headache- none.  PSYCHIATRY: Denies any depression or anxiety     Vitals:    07/31/25 0840   Weight: 68.7 kg (151 lb 8 oz)   PainSc: 0-No pain      BMI:Body mass index is 25.21 kg/m².   General Cardiology:  General Appearance: Alert, oriented and in no acute distress.  HEENT: extra ocular movements intact (EOMI), pupils equal,  round, reactive to light and accommodation (PERRLA).  Carotid Upstroke: no bruit, normal.  Jugular Venous Distention (JVD): flat.  Chest: normal.  Lungs: Clear to auscultation,   Heart Sounds: no S3 or S4, normal S1, S2, regular rate.  Murmur, Click, Gallop: soft systolic murmur.  Abdomen: no hepatomegaly, no masses felt, soft.  Extremities: no leg edema.  Peripheral pulses: 2 plus bilateral.  NEUROLOGY Cranial nerves II-XII grossly intact.     Last  "Labs:  CMP:  Recent Labs     05/25/22  0843 03/23/22  1100 10/23/21  1216    144 138   K 4.1 4.8 3.9    106 105   CO2 27 25 21*   ANIONGAP 11 13 12   BUN 17 15 14   CREATININE 0.7 0.7 0.7   EGFR 95 95 89   GLUCOSE 104* 105* 131*     Recent Labs     01/26/23  0918 05/25/22  0843 03/23/22  1100 10/23/21  1216 07/31/21  0949   ALBUMIN  --   --  4.3 3.9 4.1   ALKPHOS  --   --  80 91 77   ALT 13 18 23 23 33   AST  --   --  24 26 19   BILITOT  --   --  0.6 0.4 0.4     CBC:  Recent Labs     12/18/23  1223 03/23/22  1100 10/23/21  1216   WBC 6.1 5.6 4.3*   HGB 11.7* 12.1 11.9*   HCT 37.6 39.1 37.1    174 154   * 99.2 98.4     COAG:   Recent Labs     07/31/21  0949   INR 0.9     HEME/ENDO:  Recent Labs     10/28/24  0709 06/05/23  0711 02/17/20  1109   TSH  --   --  1.41   HGBA1C 4.9 5.2  --       CARDIAC: No results for input(s): \"LDH\", \"CKMB\", \"TROPHS\", \"BNP\" in the last 78166 hours.    No lab exists for component: \"CK\", \"CKMBP\"  Recent Labs     12/15/23  0823 01/26/23  0918 05/25/22  0843   CHOL 137 124* 142   LDLCALC 69 64* 73   HDL 52.0 42* 50*   TRIG 82 90 93       Last Cardiology Tests:  Echo:  Echo Results:  Transthoracic Echo (TTE) Complete With Contrast 07/25/2024    [address] [address]  Phone [phone]    TRANSTHORACIC ECHOCARDIOGRAM REPORT    Patient Name:      TERRY Vanessa Physician:    [id number] Roberts DO  Study Date:        7/25/2024            Ordering Provider:    [id number] PHOENIX ROBERTS  MRN/PID:           [id number]             Fellow:  Accession#:        [id number]         Nurse:  Date of Birth/Age: 1955 / 69 years  Sonographer:          Liz Ramirez RDCS  Gender:            F                    Additional Staff:  Height:            165.10 cm            Admit Date:           7/25/2024  Weight:            88.45 kg             Admission Status:     Outpatient  BSA / BMI:         1.96 m2 / 32.45      " Department Location:  [facility]  kg/m2  Blood Pressure: 114 /64 mmHg    Study Type:    TRANSTHORACIC ECHO (TTE) COMPLETE  Diagnosis/ICD: Chronic systolic (congestive) heart failure (CHF)-I50.22  Indication:    Congestive Heart Failure  CPT Codes:     Echo Complete w Full Doppler-89660    Patient History:  Pertinent History: Acute MI of anterior wall, ASHD, chronic systolic heart  failure, LV apical mural thrombus, HLD.    Study Detail: The following Echo studies were performed: 2D, M-Mode, Doppler and  color flow. Definity used as a contrast agent for endocardial  border definition. Total contrast used for this procedure was 3 mL  via IV push. Unable to obtain subcostal and suprasternal notch  view.      PHYSICIAN INTERPRETATION:  Left Ventricle: The left ventricular systolic function is moderately decreased, with a visually estimated ejection fraction of 30-35%. Wall motion is abnormal. The left ventricular cavity size is mild to moderately dilated. Left ventricular diastolic filling was indeterminate.  LV Wall Scoring:  The entire apex, mid and apical anterior wall, mid and apical anterior septum,  and mid and apical inferior septum are hypokinetic. All remaining scored  segments are normal.    Left Atrium: The left atrium is upper limits of normal in size.  Right Ventricle: The right ventricle is normal in size. There is normal right ventricular global systolic function.  Right Atrium: The right atrium is normal in size.  Aortic Valve: The aortic valve is trileaflet. The aortic valve dimensionless index is 0.59. There is no evidence of aortic valve regurgitation. The peak instantaneous gradient of the aortic valve is 8.6 mmHg. The mean gradient of the aortic valve is 4.8 mmHg.  Mitral Valve: The mitral valve is normal in structure. There is mild to moderate mitral valve regurgitation.  Tricuspid Valve: The tricuspid valve is structurally normal. No evidence of tricuspid regurgitation.  Pulmonic Valve: The  pulmonic valve is not well visualized. The pulmonic valve regurgitation was not well visualized.  Pericardium: There is no pericardial effusion noted.  Aorta: The aortic root is normal.      CONCLUSIONS:  1. Entire apex, mid and apical anterior wall, mid and apical anterior septum, and mid and apical inferior septum are abnormal.  2. The left ventricular systolic function is moderately decreased, with a visually estimated ejection fraction of 30-35%.  3. Left ventricular diastolic filling was indeterminate.  4. Left ventricular cavity size is mild to moderately dilated.  5. There is normal right ventricular global systolic function.  6. Mild to moderate mitral valve regurgitation.    QUANTITATIVE DATA SUMMARY:  2D MEASUREMENTS:  Normal Ranges:  LAs:           3.62 cm   (2.7-4.0cm)  IVSd:          0.62 cm   (0.6-1.1cm)  LVPWd:         0.90 cm   (0.6-1.1cm)  LVIDd:         6.12 cm   (3.9-5.9cm)  LVIDs:         4.51 cm  LV Mass Index: 92.3 g/m2  LV % FS        26.4 %    LA VOLUME:  Normal Ranges:  LA Vol A4C:        62.5 ml    (22+/-6mL/m2)  LA Vol A2C:        64.2 ml  LA Vol BP:         64.5 ml  LA Vol Index A4C:  32.0 ml/m2  LA Vol Index A2C:  32.8 ml/m2  LA Vol Index BP:   33.0 ml/m2  LA Area A4C:       20.3 cm2  LA Area A2C:       20.2 cm2  LA Major Axis A4C: 5.6 cm  LA Major Axis A2C: 5.4 cm  LA Volume Index:   33.2 ml/m2  LA Vol A4C:        63.0 ml  LA Vol A2C:        65.0 ml    RA VOLUME BY A/L METHOD:  Normal Ranges:  RA Vol A4C:        22.2 ml    (8.3-19.5ml)  RA Vol Index A4C:  11.3 ml/m2  RA Area A4C:       10.6 cm2  RA Major Axis A4C: 4.3 cm    AORTA MEASUREMENTS:  Normal Ranges:  Ao Sinus, d: 3.00 cm (2.1-3.5cm)  Ao STJ, d:   2.80 cm (1.7-3.4cm)  Asc Ao, d:   3.10 cm (2.1-3.4cm)    LV SYSTOLIC FUNCTION BY 2D PLANIMETRY (MOD):  Normal Ranges:  EF-A4C View:    35 % (>=55%)  EF-A2C View:    35 %  EF-Biplane:     33 %  EF-Visual:      33 %  EF-Auto:        37 %  LV EF Reported: 33 %    LV DIASTOLIC  FUNCTION:  Normal Ranges:  MV Peak E:    0.85 m/s  (0.7-1.2 m/s)  MV Peak A:    1.22 m/s  (0.42-0.7 m/s)  E/A Ratio:    0.70      (1.0-2.2)  MV e'         0.061 m/s (>8.0)  MV lateral e' 0.06 m/s  MV medial e'  0.06 m/s  E/e' Ratio:   14.01     (<8.0)    MITRAL VALVE:  Normal Ranges:  MV DT: 119 msec (150-240msec)    AORTIC VALVE:  Normal Ranges:  AoV Vmax:                1.46 m/s (<=1.7m/s)  AoV Peak P.6 mmHg (<20mmHg)  AoV Mean P.8 mmHg (1.7-11.5mmHg)  LVOT Max Temo:            0.80 m/s (<=1.1m/s)  AoV VTI:                 30.29 cm (18-25cm)  LVOT VTI:                17.77 cm  LVOT Diameter:           2.07 cm  (1.8-2.4cm)  AoV Area, VTI:           1.93 cm2 (2.5-5.5cm2)  AoV Area,Vmax:           1.81 cm2 (2.5-4.5cm2)  AoV Dimensionless Index: 0.59      RIGHT VENTRICLE:  RV Basal 3.06 cm  RV Major 7.2 cm  TAPSE:   19.5 mm  RV s'    0.12 m/s    AORTA:  Asc Ao Diam 3.06 cm      [id number] Phoenix Roberts DO  Electronically signed on 2024 at 10:19:31 AM      Wall Scoring        ** Final **      Transthoracic Echo (TTE) Complete With Contrast 12/15/2023    [place] [id number]  Phone [phone]    TRANSTHORACIC ECHOCARDIOGRAM REPORT      Patient Name:      TERRY Vanessa Physician:    [id number] Phoenix Roberts DO  Study Date:        12/15/2023          Ordering Provider:    [id number] PHOENIX ROBERTS  MRN/PID:           [id number]            Fellow:  Accession#:        [id number]        Nurse:  Date of Birth/Age: 1955 / 68 years Sonographer:          Rukhsana Davis ACS,  RDCS, FASE  Gender:            F                   Additional Staff:  Height:            165.10 cm           Admit Date:  Weight:            97.52 kg            Admission Status:  BSA:               2.04 m2             Department Location:  [facility]  Blood Pressure: 116 /60 mmHg    Study Type:    TRANSTHORACIC ECHO (TTE) COMPLETE  Diagnosis/ICD: Heart  failure, unspecified-I50.9  Indication:    Heart failure  CPT Codes:     Echo Complete w Full Doppler-[id number]    Patient History:  Pertinent History: Heart Failure, Hld, Htn, MI.    Study Detail: The following Echo studies were performed: 2D, M-Mode, Doppler and  color flow. Definity used as a contrast agent for endocardial  border definition. Total contrast used for this procedure was 4 mL  via IV push.      PHYSICIAN INTERPRETATION:  Left Ventricle: Left ventricular systolic function is mildly to moderately decreased, with an estimated ejection fraction of 40%. Wall motion is abnormal. The left ventricular cavity size is mildly dilated. Left Ventricular Global Longitudinal Strain - -10.0 %. Left ventricular diastolic filling was indeterminate. Probable LV apical thrombus.  LV Wall Scoring:  The entire apex and basal anterior segment are akinetic. The mid anteroseptal  segment is hypokinetic. All remaining scored segments are normal.    Left Atrium: The left atrium is normal in size.  Right Ventricle: The right ventricle is normal in size. There is normal right ventricular global systolic function.  Right Atrium: The right atrium is normal in size.  Aortic Valve: The aortic valve is probably trileaflet. There is no evidence of aortic valve regurgitation. The peak instantaneous gradient of the aortic valve is 9.5 mmHg.  Mitral Valve: The mitral valve is normal in structure. There is moderate mitral valve regurgitation.  Tricuspid Valve: The tricuspid valve is structurally normal. No evidence of tricuspid regurgitation.  Pulmonic Valve: The pulmonic valve is not well visualized. The pulmonic valve regurgitation was not well visualized.  Pericardium: There is no pericardial effusion noted.  Aorta: The aortic root was not well visualized.      CONCLUSIONS:  1. Left ventricular systolic function is mildly to moderately decreased with a 40% estimated ejection fraction.  2. Entire apex, basal anterior segment, and mid  anteroseptal segment are abnormal.  3. Moderate mitral valve regurgitation.  4. Probable small to moderate sized LV apical thrombus.    QUANTITATIVE DATA SUMMARY:  2D MEASUREMENTS:  Normal Ranges:  IVSd:          1.17 cm    (0.6-1.1cm)  LVPWd:         1.14 cm    (0.6-1.1cm)  LVIDd:         5.27 cm    (3.9-5.9cm)  LVIDs:         4.59 cm  LV Mass Index: 118.2 g/m2  LV % FS        12.9 %    LA VOLUME:  Normal Ranges:  LA Vol A4C:        45.4 ml    (22+/-6mL/m2)  LA Vol A2C:        74.8 ml  LA Vol BP:         59.7 ml  LA Vol Index A4C:  22.2ml/m2  LA Vol Index A2C:  36.7 ml/m2  LA Vol Index BP:   29.3 ml/m2  LA Area A4C:       16.7 cm2  LA Area A2C:       22.0 cm2  LA Major Axis A4C: 5.2 cm  LA Major Axis A2C: 5.5 cm  LA Volume Index:   29.2 ml/m2  LA Vol A4C:        38.8 ml  LA Vol A2C:        74.2 ml    RA VOLUME BY A/L METHOD:  Normal Ranges:  RA Vol A4C:        29.2 ml    (8.3-19.5ml)  RA Vol Index A4C:  14.3 ml/m2  RA Area A4C:       12.6 cm2  RA Major Axis A4C: 4.7 cm    AORTA MEASUREMENTS:  Normal Ranges:  Asc Ao, d: 3.00 cm (2.1-3.4cm)    LV SYSTOLIC FUNCTION BY 2D PLANIMETRY (MOD):  Normal Ranges:  EF-A4C View:                      40.9 %  (>=55%)  EF-A2C View:                      34.8 %  EF-Biplane:                       38.6 %  Global Longitudinal Strain (GLS): -10.0 %    LV DIASTOLIC FUNCTION:  Normal Ranges:  MV Peak E:    1.12 m/s (0.7-1.2 m/s)  MV Peak A:    1.14 m/s (0.42-0.7 m/s)  E/A Ratio:    0.98     (1.0-2.2)  MV e'         0.08 m/s (>8.0)  MV lateral e' 0.10 m/s  MV medial e'  0.07 m/s  E/e' Ratio:   13.18    (<8.0)    MITRAL VALVE:  Normal Ranges:  MV DT: 151 msec (150-240msec)    MITRAL INSUFFICIENCY:  Normal Ranges:  PISA Radius: 0.2 cm  MR VTI:      182.00 cm  MR Vmax:     534.00 cm/s    AORTIC VALVE:  Normal Ranges:  AoV Vmax:      1.54 m/s (<=1.7m/s)  AoV Peak P.5 mmHg (<20mmHg)  LVOT Max Temo:  0.97 m/s (<=1.1m/s)  LVOT VTI:      20.90 cm  LVOT Diameter: 2.10 cm  (1.8-2.4cm)  AoV  Area,Vmax: 2.17 cm2 (2.5-4.5cm2)      RIGHT VENTRICLE:  RV Basal 3.37 cm  TAPSE:   23.5 mm  RV s'    0.11 m/s    TRICUSPID VALVE/RVSP:  Normal Ranges:  IVC Diam: 2.01 cm      [id number] Miguel A Roberts DO  Electronically signed on 12/15/2023 at 11:41:29 AM      Wall Scoring        ** Final **     Cath:  Stress Test:  Stress Results:  No results found for this or any previous visit from the past 365 days.     Cardiac Imaging:    Problem List Items Addressed This Visit       Ischemic cardiomyopathy - Primary    Will repeat her echocardiogram for updated reassessment of left and right heart function and see if her ischemic cardiomyopathy has improved.  If left ventricular ejection fraction is 35% or less it would be reasonable to recommend ICD implantation for primary prevention of SCD.           She has had previous MUGA scans twice that confirmed left ventricular ejection fraction in the 29% to 35% range.    She has another echocardiogram pending next week and will follow-up with me after that study to review the results.  We had a long discussion about the pros and cons of ICD implantation and she is consistent that she does not want that device implanted at this time but understands the concept of implantation as a protective preventative measure.          Pt. care time is spent includes review of diagnostic tests, labs, radiographs, EKGs, old echoes, cardiac work-up and coordination of care. Assessment, impression and plans are reflected in the note above as well as the orders.    Lobo Frias DO  [facility] [place]  [facility]